# Patient Record
Sex: FEMALE | Race: WHITE | NOT HISPANIC OR LATINO | Employment: OTHER | URBAN - METROPOLITAN AREA
[De-identification: names, ages, dates, MRNs, and addresses within clinical notes are randomized per-mention and may not be internally consistent; named-entity substitution may affect disease eponyms.]

---

## 2023-09-27 ENCOUNTER — OFFICE VISIT (OUTPATIENT)
Dept: PRIMARY CARE | Facility: CLINIC | Age: 73
End: 2023-09-27
Payer: MEDICARE

## 2023-09-27 ENCOUNTER — LAB (OUTPATIENT)
Dept: LAB | Facility: LAB | Age: 73
End: 2023-09-27
Payer: MEDICARE

## 2023-09-27 VITALS
HEART RATE: 68 BPM | DIASTOLIC BLOOD PRESSURE: 64 MMHG | OXYGEN SATURATION: 97 % | WEIGHT: 173 LBS | BODY MASS INDEX: 30.65 KG/M2 | RESPIRATION RATE: 20 BRPM | TEMPERATURE: 97.6 F | SYSTOLIC BLOOD PRESSURE: 118 MMHG

## 2023-09-27 DIAGNOSIS — R42 DIZZINESSES: Primary | ICD-10-CM

## 2023-09-27 DIAGNOSIS — H91.91 DECREASED HEARING OF RIGHT EAR: ICD-10-CM

## 2023-09-27 DIAGNOSIS — J06.9 UPPER RESPIRATORY TRACT INFECTION, UNSPECIFIED TYPE: ICD-10-CM

## 2023-09-27 DIAGNOSIS — R42 DIZZINESSES: ICD-10-CM

## 2023-09-27 PROBLEM — R21 LOCALIZED PAPULAR RASH: Status: ACTIVE | Noted: 2023-09-27

## 2023-09-27 PROBLEM — R10.2 PELVIC PRESSURE IN FEMALE: Status: ACTIVE | Noted: 2023-09-27

## 2023-09-27 PROBLEM — I83.12 VARICOSE VEINS OF LOWER EXTREMITY WITH INFLAMMATION, BILATERAL: Status: ACTIVE | Noted: 2023-09-27

## 2023-09-27 PROBLEM — K22.70 BARRETT ESOPHAGUS: Status: ACTIVE | Noted: 2023-09-27

## 2023-09-27 PROBLEM — H91.93 HEARING LOSS, BILATERAL: Status: ACTIVE | Noted: 2023-09-27

## 2023-09-27 PROBLEM — M54.9 BACK PAIN, ACUTE: Status: ACTIVE | Noted: 2023-09-27

## 2023-09-27 PROBLEM — L21.9 SEBORRHEIC DERMATITIS: Status: ACTIVE | Noted: 2023-09-27

## 2023-09-27 PROBLEM — R79.89 ABNORMAL TSH: Status: ACTIVE | Noted: 2023-09-27

## 2023-09-27 PROBLEM — J01.00 ACUTE MAXILLARY SINUSITIS: Status: ACTIVE | Noted: 2023-09-27

## 2023-09-27 PROBLEM — I83.11 VARICOSE VEINS OF LOWER EXTREMITY WITH INFLAMMATION, BILATERAL: Status: ACTIVE | Noted: 2023-09-27

## 2023-09-27 PROBLEM — K21.9 GERD (GASTROESOPHAGEAL REFLUX DISEASE): Status: ACTIVE | Noted: 2023-09-27

## 2023-09-27 LAB
ALANINE AMINOTRANSFERASE (SGPT) (U/L) IN SER/PLAS: 19 U/L (ref 7–45)
ALBUMIN (G/DL) IN SER/PLAS: 4.2 G/DL (ref 3.4–5)
ALKALINE PHOSPHATASE (U/L) IN SER/PLAS: 53 U/L (ref 33–136)
ANION GAP IN SER/PLAS: 10 MMOL/L (ref 10–20)
ASPARTATE AMINOTRANSFERASE (SGOT) (U/L) IN SER/PLAS: 22 U/L (ref 9–39)
BILIRUBIN TOTAL (MG/DL) IN SER/PLAS: 0.8 MG/DL (ref 0–1.2)
CALCIUM (MG/DL) IN SER/PLAS: 9.2 MG/DL (ref 8.6–10.3)
CARBON DIOXIDE, TOTAL (MMOL/L) IN SER/PLAS: 30 MMOL/L (ref 21–32)
CHLORIDE (MMOL/L) IN SER/PLAS: 105 MMOL/L (ref 98–107)
CREATININE (MG/DL) IN SER/PLAS: 1.08 MG/DL (ref 0.5–1.05)
ERYTHROCYTE DISTRIBUTION WIDTH (RATIO) BY AUTOMATED COUNT: 13.2 % (ref 11.5–14.5)
ERYTHROCYTE MEAN CORPUSCULAR HEMOGLOBIN CONCENTRATION (G/DL) BY AUTOMATED: 32.1 G/DL (ref 32–36)
ERYTHROCYTE MEAN CORPUSCULAR VOLUME (FL) BY AUTOMATED COUNT: 94 FL (ref 80–100)
ERYTHROCYTES (10*6/UL) IN BLOOD BY AUTOMATED COUNT: 4.62 X10E12/L (ref 4–5.2)
GFR FEMALE: 54 ML/MIN/1.73M2
GLUCOSE (MG/DL) IN SER/PLAS: 96 MG/DL (ref 74–99)
HEMATOCRIT (%) IN BLOOD BY AUTOMATED COUNT: 43.3 % (ref 36–46)
HEMOGLOBIN (G/DL) IN BLOOD: 13.9 G/DL (ref 12–16)
LEUKOCYTES (10*3/UL) IN BLOOD BY AUTOMATED COUNT: 5.2 X10E9/L (ref 4.4–11.3)
PLATELETS (10*3/UL) IN BLOOD AUTOMATED COUNT: 201 X10E9/L (ref 150–450)
POTASSIUM (MMOL/L) IN SER/PLAS: 4.2 MMOL/L (ref 3.5–5.3)
PROTEIN TOTAL: 7 G/DL (ref 6.4–8.2)
SODIUM (MMOL/L) IN SER/PLAS: 141 MMOL/L (ref 136–145)
UREA NITROGEN (MG/DL) IN SER/PLAS: 18 MG/DL (ref 6–23)

## 2023-09-27 PROCEDURE — 80053 COMPREHEN METABOLIC PANEL: CPT

## 2023-09-27 PROCEDURE — 99214 OFFICE O/P EST MOD 30 MIN: CPT | Performed by: NURSE PRACTITIONER

## 2023-09-27 PROCEDURE — 1036F TOBACCO NON-USER: CPT | Performed by: NURSE PRACTITIONER

## 2023-09-27 PROCEDURE — 1159F MED LIST DOCD IN RCRD: CPT | Performed by: NURSE PRACTITIONER

## 2023-09-27 PROCEDURE — 1160F RVW MEDS BY RX/DR IN RCRD: CPT | Performed by: NURSE PRACTITIONER

## 2023-09-27 PROCEDURE — 87635 SARS-COV-2 COVID-19 AMP PRB: CPT

## 2023-09-27 PROCEDURE — 85027 COMPLETE CBC AUTOMATED: CPT

## 2023-09-27 PROCEDURE — 36415 COLL VENOUS BLD VENIPUNCTURE: CPT

## 2023-09-27 RX ORDER — CALCIUM CARB/MAGNESIUM CARB 311-232MG
TABLET ORAL
COMMUNITY

## 2023-09-27 RX ORDER — AMOXICILLIN AND CLAVULANATE POTASSIUM 875; 125 MG/1; MG/1
875 TABLET, FILM COATED ORAL 2 TIMES DAILY
Qty: 20 TABLET | Refills: 0 | Status: SHIPPED | OUTPATIENT
Start: 2023-09-27 | End: 2023-10-07

## 2023-09-27 RX ORDER — FLUTICASONE PROPIONATE 50 MCG
1 SPRAY, SUSPENSION (ML) NASAL DAILY
Qty: 16 G | Refills: 0 | Status: SHIPPED | OUTPATIENT
Start: 2023-09-27 | End: 2023-11-07 | Stop reason: ALTCHOICE

## 2023-09-27 RX ORDER — ASPIRIN 81 MG/1
81 TABLET ORAL DAILY
COMMUNITY
End: 2023-11-07 | Stop reason: ALTCHOICE

## 2023-09-27 ASSESSMENT — ENCOUNTER SYMPTOMS
MYALGIAS: 0
APPETITE CHANGE: 1
SINUS PAIN: 1
SPEECH DIFFICULTY: 0
NAUSEA: 0
DIZZINESS: 1
SORE THROAT: 0
ABDOMINAL PAIN: 0
WHEEZING: 0
VOMITING: 1
HEADACHES: 0
FATIGUE: 1
DIARRHEA: 0
FEVER: 0
WEAKNESS: 0
CHILLS: 0
COUGH: 1
SHORTNESS OF BREATH: 0
SINUS PRESSURE: 1
LIGHT-HEADEDNESS: 0
RHINORRHEA: 1

## 2023-09-27 NOTE — PROGRESS NOTES
Subjective   Patient ID: Lauren Patel is a 73 y.o. female who presents for Sinusitis.    Patient has had a lot of dizziness since April.     Patient says that she developed a runny nose, stuffy nose one week ago. Patient says that she has a lot of sinus pressure. Patient says that yesterday when she was taking a walk, she noticed that her balanced was off. Patient says that she needs to hold on to the walls when walking. Patient vomited today. Patient is vaccinated against COVID with boosters (not the most recent). Pt denies any weakness. Pt did an at home COVID test today and it was negative.     She says that she does not have a PCP.          Review of Systems   Constitutional:  Positive for appetite change and fatigue. Negative for chills and fever.   HENT:  Positive for congestion, rhinorrhea, sinus pressure and sinus pain. Negative for sore throat.    Respiratory:  Positive for cough. Negative for shortness of breath and wheezing.    Cardiovascular:  Negative for chest pain.   Gastrointestinal:  Positive for vomiting. Negative for abdominal pain, diarrhea and nausea.   Musculoskeletal:  Negative for myalgias.   Neurological:  Positive for dizziness. Negative for speech difficulty, weakness, light-headedness and headaches.       Objective   /64   Pulse 68   Temp 36.4 °C (97.6 °F) (Temporal)   Resp 20   Wt 78.5 kg (173 lb)   SpO2 97%   BMI 30.65 kg/m²     Physical Exam  Vitals reviewed.   Constitutional:       General: She is not in acute distress.     Appearance: Normal appearance. She is not ill-appearing or toxic-appearing.   HENT:      Head: Atraumatic.      Right Ear: Tympanic membrane, ear canal and external ear normal.      Left Ear: Tympanic membrane, ear canal and external ear normal.      Nose: Congestion and rhinorrhea present.      Right Sinus: Maxillary sinus tenderness and frontal sinus tenderness present.      Left Sinus: Maxillary sinus tenderness and frontal sinus tenderness  present.      Mouth/Throat:      Mouth: Mucous membranes are moist.      Pharynx: Oropharynx is clear. No oropharyngeal exudate or posterior oropharyngeal erythema.   Eyes:      Conjunctiva/sclera: Conjunctivae normal.      Pupils: Pupils are equal, round, and reactive to light.   Cardiovascular:      Rate and Rhythm: Normal rate and regular rhythm.      Heart sounds: Normal heart sounds. No murmur heard.  Pulmonary:      Effort: Pulmonary effort is normal.      Breath sounds: Normal breath sounds. No wheezing or rhonchi.   Musculoskeletal:         General: Normal range of motion.   Skin:     General: Skin is warm and dry.   Neurological:      General: No focal deficit present.      Mental Status: She is alert.   Psychiatric:         Mood and Affect: Mood normal.         Behavior: Behavior normal.     Assessment/Plan   Problem List Items Addressed This Visit    None  Visit Diagnoses         Codes    Dizzinesses    -  Primary R42    Relevant Orders    CBC    Comprehensive Metabolic Panel    Decreased hearing of right ear     H91.91    Relevant Orders    Referral to ENT    Upper respiratory tract infection, unspecified type     J06.9    Relevant Medications    amoxicillin-pot clavulanate (Augmentin) 875-125 mg tablet    fluticasone (Flonase) 50 mcg/actuation nasal spray    Other Relevant Orders    Sars-CoV-2 PCR, Symptomatic        Patient does not have a PCP at this time and has not had labs done in several years. Will order basic blood work.  staff to help patient establish care with a new provider.     Patient has had some hearing loss in the ear. She was referred to ENT for a hearing test and further evaluation.     She has also been having on going dizziness. I advised patient that she should have a CT scan to further evaluate her ongoing dizziness. Patient declined today and said that she would like to talk it over with her . Patient declines the need to do further workup regarding this.      Will get COVID testing done today. Pt also started on augmentin. Advised patient on use of humidifier and hot steam treatments. Discussed that patient is to drink plenty of fluids and stay well hydrated. Can take tylenol as needed for any fevers or discomfort. Patient can use mucinex and flonase as well. Discussed that patient is to go to the ER for any chest pain, difficulty breathing, shortness of breath or new/concerning symptoms; she agreed. Will call pt when results become available. Pt reminded to self quarantine; she agreed. Pt to follow up in 2- 3 days if no better despite the use of the medications.

## 2023-09-28 ENCOUNTER — TELEPHONE (OUTPATIENT)
Dept: PRIMARY CARE | Facility: CLINIC | Age: 73
End: 2023-09-28
Payer: MEDICARE

## 2023-09-28 LAB — SARS-COV-2 RESULT: NOT DETECTED

## 2023-09-28 NOTE — TELEPHONE ENCOUNTER
Spoke to patient and relayed results of blood work. Patient's creatinine is slightly elevated from last year. Patient has been having back pain and has been taking ibuprofen daily. I advised that she should stop taking ibuprofen and try tylenol instead. Patient was encouraged to establish care with a provider and she says that she will schedule an appt with a doctor that her daughter sees that is by her house. Patient declined the need for any help setting up an appt.

## 2023-10-18 ENCOUNTER — APPOINTMENT (OUTPATIENT)
Dept: AUDIOLOGY | Facility: CLINIC | Age: 73
End: 2023-10-18
Payer: MEDICARE

## 2023-10-18 ENCOUNTER — APPOINTMENT (OUTPATIENT)
Dept: OTOLARYNGOLOGY | Facility: CLINIC | Age: 73
End: 2023-10-18
Payer: MEDICARE

## 2023-10-18 RX ORDER — PANTOPRAZOLE SODIUM 40 MG/1
40 TABLET, DELAYED RELEASE ORAL 2 TIMES DAILY
COMMUNITY
Start: 2014-12-23 | End: 2023-11-07 | Stop reason: ALTCHOICE

## 2023-10-18 RX ORDER — ACETAMINOPHEN 325 MG/1
650 TABLET ORAL EVERY 6 HOURS PRN
COMMUNITY
End: 2023-11-07 | Stop reason: ALTCHOICE

## 2023-10-18 RX ORDER — OMEPRAZOLE 20 MG/1
TABLET, DELAYED RELEASE ORAL
COMMUNITY
End: 2023-11-07 | Stop reason: ALTCHOICE

## 2023-10-18 RX ORDER — SUCRALFATE 1 G/1
TABLET ORAL
COMMUNITY
Start: 2015-04-01 | End: 2023-11-07 | Stop reason: ALTCHOICE

## 2023-10-18 RX ORDER — PHENYLPROPANOLAMINE/CLEMASTINE 75-1.34MG
200 TABLET, EXTENDED RELEASE ORAL AS NEEDED
COMMUNITY
End: 2023-11-07 | Stop reason: ALTCHOICE

## 2023-10-18 RX ORDER — LANSOPRAZOLE 30 MG/1
30 TABLET, ORALLY DISINTEGRATING, DELAYED RELEASE ORAL
COMMUNITY
Start: 2017-04-05 | End: 2023-11-07 | Stop reason: ALTCHOICE

## 2023-10-23 ENCOUNTER — OFFICE VISIT (OUTPATIENT)
Dept: OTOLARYNGOLOGY | Facility: CLINIC | Age: 73
End: 2023-10-23
Payer: MEDICARE

## 2023-10-23 ENCOUNTER — CLINICAL SUPPORT (OUTPATIENT)
Dept: AUDIOLOGY | Facility: CLINIC | Age: 73
End: 2023-10-23
Payer: MEDICARE

## 2023-10-23 VITALS
DIASTOLIC BLOOD PRESSURE: 79 MMHG | SYSTOLIC BLOOD PRESSURE: 133 MMHG | WEIGHT: 170.6 LBS | HEIGHT: 62 IN | TEMPERATURE: 97.3 F | BODY MASS INDEX: 31.39 KG/M2

## 2023-10-23 DIAGNOSIS — H90.3 BILATERAL HIGH FREQUENCY SENSORINEURAL HEARING LOSS: Primary | ICD-10-CM

## 2023-10-23 DIAGNOSIS — H90.3 BILATERAL SENSORINEURAL HEARING LOSS: Primary | ICD-10-CM

## 2023-10-23 DIAGNOSIS — H93.13 TINNITUS OF BOTH EARS: ICD-10-CM

## 2023-10-23 PROCEDURE — 1036F TOBACCO NON-USER: CPT | Performed by: OTOLARYNGOLOGY

## 2023-10-23 PROCEDURE — 1160F RVW MEDS BY RX/DR IN RCRD: CPT | Performed by: OTOLARYNGOLOGY

## 2023-10-23 PROCEDURE — 99204 OFFICE O/P NEW MOD 45 MIN: CPT | Performed by: OTOLARYNGOLOGY

## 2023-10-23 PROCEDURE — 1159F MED LIST DOCD IN RCRD: CPT | Performed by: OTOLARYNGOLOGY

## 2023-10-23 PROCEDURE — 92557 COMPREHENSIVE HEARING TEST: CPT | Performed by: AUDIOLOGIST

## 2023-10-23 PROCEDURE — 92550 TYMPANOMETRY & REFLEX THRESH: CPT | Performed by: AUDIOLOGIST

## 2023-10-23 ASSESSMENT — PAIN - FUNCTIONAL ASSESSMENT: PAIN_FUNCTIONAL_ASSESSMENT: 0-10

## 2023-10-23 ASSESSMENT — ENCOUNTER SYMPTOMS: OCCASIONAL FEELINGS OF UNSTEADINESS: 0

## 2023-10-23 ASSESSMENT — PAIN SCALES - GENERAL: PAINLEVEL_OUTOF10: 0 - NO PAIN

## 2023-10-23 NOTE — PROGRESS NOTES
Subjective   Patient ID: Lauren Patel is a 73 y.o. female who presents for No chief complaint on file..  Subjective:  HPI  Lauren is a 73 year old female here for hearing loss.  The duration of his complaint is ***.    When asked about ear pain, ear itching,  ear drainage, aural fullness, hearing loss, autophony, tinnitus, dizziness or vertigo, he admits to ***.   When asked about previous ear surgeries, family history of hearing loss, or exposure to loud noise, the patient admits to ***.   When asked about past or current use of hearing aids, the patient admits to ***.   When asked about past or current use of Q-tips or foreign objects in the ear canal, the patient admits to ***.     Review of Systems   All other systems reviewed and are negative.    Objective   Physical Exam  Constitutional: No fever, chills, weight loss or weight gain  General appearance: Appears well, well-nourished, well groomed. No acute distress.   Communication: Normal communication  Psychiatric: Oriented to person, place and time. Normal mood and affect.  Neurologic: Cranial nerves II-XII grossly intact and symmetric bilaterally.     Head and Face:  Head: Atraumatic with no masses, lesions or scarring.  Face: Normal symmetry. No scars or deformities.  TMJ: Normal, no trismus.     Eyes: Conjunctiva not edematous or erythematous. PERRLA     Right Ear: External inspection of ear with no deformity, scars, or masses. EAC is clear.  TM is intact with no sign of infection, effusion, or retraction.  No perforation seen.  Left Ear: External inspection of ear with no deformity, scars, or masses. EAC is clear.  TM is intact with no sign of infection, effusion, or retraction.  No perforation seen.     Nose: External inspection of nose: No nasal lesions, lacerations or scars. Anterior rhinoscopy with limited visualization past the inferior turbinates. No tenderness on frontal or maxillary sinus palpation.     Oral Cavity/Mouth: Oral cavity and  oropharynx mucosa moist and pink. No lesions or masses. Dentition normal. Tonsils appear normal. Uvula is midline. Tongue with no masses or lesions. Tongue with good mobility. The oropharynx is clear.     Neck: Normal appearing, symmetric, trachea midline.   Cardiovascular: Examination of peripheral vascular system shows no clubbing or cyanosis.  Respiratory: No respiratory distress increased work of breathing. Inspection of the chest with symmetric chest expansion and normal respiratory effort.  Skin: No head and neck rashes.  Lymph nodes: No adenopathy.    RESULTS:  I personally reviewed the patient's audiogram today which showed: Normal hearing across all frequencies in bilateral ears. Normal tympanogram bilaterally. Excellent word discrimination bilaterally. Acoustic reflexes present right ipsilateral, and left ipsilateral.  Assessment/Plan   {Assess/PlanSmartLinks:[id number]}

## 2023-10-23 NOTE — PROGRESS NOTES
AUDIOLOGY ADULT AUDIOMETRIC EVALUATION      Name:  Lauren Patel  :  1950  Age:  73 y.o.  Date of Evaluation: 10/23/23    History:  Reason for visit:  Ms. Patel is seen today as part of the visit with Misbah Villa D.O. for an evaluation of hearing.   Chief Complaint   Patient presents with    Hearing Loss      Stated for the past fifteen to twenty years she has noticed a gradual loss of hearing sensitivity in each ear. Stated she tried hearing aids approximately fifteen years ago, however, stated she did not find them to be helpful and they were uncomfortable. She has not worn hearing aids since that time.   Admitted she has experienced more difficulty understanding conversations and has been missing words. Her  has been more concerned for her hearing and communication.   The patient reported a dull ringing bilateral constant non-bothersome tinnitus for many years. Denied any changes in the tinnitus. Denied any difficulty sleeping or communicating due to the tinnitus.  History of some slight imbalance and dizziness and has noticed some slight sensations of feeling off balance or spinning slightly if she moves towards the right.   Denied any otalgia, aural fullness, ear pressure, ear surgery, recent ear/sinus infections, recent falls, significant noise exposure, sinus/throat concerns, ear drainage, or sudden hearing loss.    EVALUATION      See Audiogram    RESULTS:    Otoscopic Evaluation:   Right Ear: Otoscopy for the right ear revealed a clear healthy canal and a healthy tympanic membrane was visualized.  Note narrow canal  Left Ear: Otoscopy for the left ear revealed a clear healthy canal and a healthy tympanic membrane was visualized.  Note narrow canal.    Immittance:  Immittance Measures: 226 Hz   Right Ear: Tympanometric testing of the right ear revealed a type B flat tympanogram with no measurable middle ear pressure or static compliance and normal ear canal volume. Results should be  interpreted with caution as it was very difficult to seal the canal.  Left Ear: Tympanometric testing of the right ear revealed a normal type A tympanogram with normal middle ear pressure and normal static compliance.    Ipsilateral acoustic reflexes were absent at, 500-4,000 Hz, in the right ear and present only at 2,000 Hz in the left ear. Results are consistent with the test results.     Test technique:  Pure Tone Audiometry via insert earphones     Reliability:   excellent    Pure Tone Audiometry:    Right Ear: Audiometric testing of the right ear using insert earphones indicated a mild sensorineural hearing loss through 500 Hz, gradually sloping to a moderate sensorineural hearing loss above.  Left Ear:   Audiometric testing of the left ear using insert earphones indicated a mild sensorineural hearing loss through 500 Hz, gradually sloping to a moderate sensorineural hearing loss above.      Speech Audiometry:   Right Ear:  Speech Reception Threshold (SRT) was obtained at 55 dBHL                 Word Recognition scores were fair (76%) in quiet when words were presented at 85 dBHL  Left Ear:  Speech Reception Threshold (SRT) was obtained at 50 dBHL                 Word Recognition scores were excellent (92%) in quiet when words were presented at 85 dBHL  Testing was performed with recorded NU-6 speech words in quiet. Speech thresholds were in good agreement with the pure tone averages in each ear.   Note significant asymmetry in the right ear given the symmetrical pure tone responses.     IMPRESSIONS:  Today's test results are hearing loss requiring medical/otologic and audiologic follow-up.  The patient was counseled with regard to the findings.    Amplification needs:  Hearing aids were recommended due to the hearing loss and the patient's concerns for hearing difficulties.     RECOMMENDATIONS:  * Continue medical follow up with Dr. Villa.  * Retest as medically indicated, or sooner if a change in hearing  sensitivity or tinnitus is noticed.   * Wear hearing protection while in the presence of loud sounds.   * Use tinnitus coping strategies as needed, such as sound apps on a smart phone, utilizing calming noise in the room, running a fan at night, etc.   * Pending medical management and patient desire, consider returning for a hearing aid evaluation to discuss amplification options and communication needs. The patient was instructed to call their insurance to check for any hearing aid benefits and to determine if [facility] was in network for hearing aids.   * Use effective communication strategies such as asking the speaker to gain attention prior to speaking, speaking in the same room, repeating words that were heard, etc.    PATIENT EDUCATION:   Discussed results and recommendations with the patient and a copy of the hearing test was provided.  Questions were addressed and the patient was encouraged to contact our department should concerns arise.  Discussed speech intelligibility, cognitive load and listening effort. The patient was counseled although there are still significant hearing abilities, the sound sounds of speech are not coming in as easily as they once did. Counseled to consider hearing aids, to help reduce the amount of listening effort required by the brain.  The patient was seen from  10:00-10:30 am.

## 2023-10-23 NOTE — PROGRESS NOTES
Impression:  1. Bilateral high frequency sensorineural hearing loss  Referral to ENT         RECOMMENDATIONS/PLAN :  I reassured the patient that medically her ears look excellent.  It appears that she would be a good candidate for hearing aids and she is considering some behind-the-ear hearing aids in the future.  We will repeat an audiogram over the next couple of years or sooner with any sudden changes.      **This electronic medical record note was created with the use of voice recognition software.  Despite proofreading, typographical or grammatical errors may be present that could affect meaning of content **    Subjective   Patient ID:     Lauren Patel is a 73 y.o. female who presents to the office today with a longstanding history of hearing loss.  She denies any recent trauma to the head neck or ears or any history of chronic middle ear infections.  Many years ago she did have vertigo but she has not had any true spinning lately.  She denies any other neurologic complaints.    ROS:  A detailed 12 system review of systems is noted on the intake form has been reviewed with the patient with details noted in the HPI and scanned into the patient's medical record.    Objective     No past medical history on file.     Past Surgical History:   Procedure Laterality Date    OTHER SURGICAL HISTORY  11/01/2019    No history of surgery        Allergies   Allergen Reactions    Sulfamethoxazole Unknown          Current Outpatient Medications:     acetaminophen (Tylenol) 325 mg tablet, Take 2 tablets (650 mg) by mouth every 6 hours if needed., Disp: , Rfl:     aspirin 81 mg EC tablet, Take 1 tablet (81 mg) by mouth once daily., Disp: , Rfl:     melatonin 5 mg tablet,disintegrating, Take by mouth., Disp: , Rfl:     fluticasone (Flonase) 50 mcg/actuation nasal spray, Administer 1 spray into each nostril once daily. Shake gently. Before first use, prime pump. After use, clean tip and replace cap. (Patient not taking:  "Reported on 10/23/2023), Disp: 16 g, Rfl: 0    ibuprofen (Motrin) capsule, Take 1 capsule (200 mg) by mouth if needed., Disp: , Rfl:     lansoprazole (Prevacid SoluTab) 30 mg disintegrating tablet, Take 1 tablet (30 mg) by mouth once daily., Disp: , Rfl:     multivitamin with minerals iron-free (multivit-iron-minerals-folic acid), Take by mouth., Disp: , Rfl:     omeprazole OTC (PriLOSEC OTC) 20 mg EC tablet, Take by mouth., Disp: , Rfl:     pantoprazole (ProtoNix) 40 mg EC tablet, Take 1 tablet (40 mg) by mouth 2 times a day., Disp: , Rfl:     sucralfate (Carafate) 1 gram tablet, 1 take by mouth four times a day, dissolve on tab in water and drink mixture four times a day, Disp: , Rfl:      Tobacco Use: Low Risk  (10/23/2023)    Patient History     Smoking Tobacco Use: Never     Smokeless Tobacco Use: Never     Passive Exposure: Not on file        Alcohol Use: Not on file        Social History     Substance and Sexual Activity   Drug Use Never        Physical Exam:  Visit Vitals  /79   Temp 36.3 °C (97.3 °F) (Temporal)   Ht 1.575 m (5' 2\")   Wt 77.4 kg (170 lb 9.6 oz)   BMI 31.20 kg/m²   Smoking Status Never   BSA 1.84 m²      General: Patient is alert, oriented, cooperative in no apparent distress.  Head: Normocephalic, atraumatic.  Eyes: PERRL, EOMI, Conjunctiva is clear. No nystagmus.  Ears: Right Ear-- Pinna is normal.  External auditory canal is patent, no lesions. Tympanic membrane is [intact, translucent and has good mobility with my pneumatic otoscope. No effusion].  Mastoid is nontender.  Left ear-- Pinna is normal.  External auditory canal is patent, no lesions.  Tympanic membrane is [intact, translucent and has good mobility with my pneumatic otoscope.  No effusion].  Mastoid is nontender.  Nose: Septum is relatively straight.  No septal perforation or lesions. No septal hematoma/ seroma.  No signs of bleeding.  Inferior turbinates are normal.   No evidence of intranasal polyps.    Throat:  Floor " of mouth is clear, no masses.  Tongue appears normal, no lesions or masses. Gums, gingiva, buccal mucosa appear pink and moist, no lesions.  Upper and lower denture.  No lesions.  Peritonsillar regions appear symmetric without swelling.  Hard and soft palate appear normal, no obvious cleft. Uvula is midline.  Oropharynx: No lesions. Retropharyngeal wall is flat.  No active postnasal drip.  Neck: Supple,  no lymphadenopathy.  No masses.  Salivary Glands: Symmetric bilaterally.  No palpable masses.  No evidence of acute infection or salivary stones  Neurologic: Cranial Nerves 2-12 are grossly intact without focal deficits. Cerebellar function testing is normal.     Results:   I reviewed her recent audiogram and she has a moderate to severe high-frequency sensorineural loss in both ears.  Both tympanic membranes have good mobility on tympanometry.  Word recognition score was 76% in the right ear and 92% in the left ear.  Speech reception threshold is 55 dB in the right ear and 50 dB in the left ear.  Procedure:   []    Misbah Villa, DO

## 2023-11-07 ENCOUNTER — LAB (OUTPATIENT)
Dept: LAB | Facility: LAB | Age: 73
End: 2023-11-07
Payer: MEDICARE

## 2023-11-07 ENCOUNTER — OFFICE VISIT (OUTPATIENT)
Dept: PRIMARY CARE | Facility: CLINIC | Age: 73
End: 2023-11-07
Payer: MEDICARE

## 2023-11-07 VITALS
TEMPERATURE: 97.2 F | WEIGHT: 170.2 LBS | SYSTOLIC BLOOD PRESSURE: 128 MMHG | DIASTOLIC BLOOD PRESSURE: 82 MMHG | BODY MASS INDEX: 30.16 KG/M2 | HEART RATE: 60 BPM | HEIGHT: 63 IN

## 2023-11-07 DIAGNOSIS — Z78.9 NON-SMOKER: ICD-10-CM

## 2023-11-07 DIAGNOSIS — R79.89 ABNORMAL TSH: ICD-10-CM

## 2023-11-07 DIAGNOSIS — E03.8 TSH (THYROID-STIMULATING HORMONE DEFICIENCY): ICD-10-CM

## 2023-11-07 DIAGNOSIS — Z13.89 OBESITY SCREEN: ICD-10-CM

## 2023-11-07 DIAGNOSIS — H91.8X3 OTHER SPECIFIED HEARING LOSS OF BOTH EARS: ICD-10-CM

## 2023-11-07 DIAGNOSIS — N18.31 STAGE 3A CHRONIC KIDNEY DISEASE (MULTI): ICD-10-CM

## 2023-11-07 DIAGNOSIS — Z87.39 HX OF LOW BACK PAIN: ICD-10-CM

## 2023-11-07 DIAGNOSIS — Z86.79 HISTORY OF VARICOSE VEINS: ICD-10-CM

## 2023-11-07 LAB
25(OH)D3 SERPL-MCNC: 28 NG/ML (ref 30–100)
ALBUMIN SERPL BCP-MCNC: 4.3 G/DL (ref 3.4–5)
ALP SERPL-CCNC: 60 U/L (ref 33–136)
ALT SERPL W P-5'-P-CCNC: 15 U/L (ref 7–45)
ANION GAP SERPL CALC-SCNC: 12 MMOL/L (ref 10–20)
AST SERPL W P-5'-P-CCNC: 19 U/L (ref 9–39)
BASOPHILS # BLD AUTO: 0.03 X10*3/UL (ref 0–0.1)
BASOPHILS NFR BLD AUTO: 0.7 %
BILIRUB SERPL-MCNC: 1.1 MG/DL (ref 0–1.2)
BUN SERPL-MCNC: 16 MG/DL (ref 6–23)
CALCIUM SERPL-MCNC: 9.1 MG/DL (ref 8.6–10.3)
CHLORIDE SERPL-SCNC: 103 MMOL/L (ref 98–107)
CHOLEST SERPL-MCNC: 177 MG/DL (ref 0–199)
CHOLESTEROL/HDL RATIO: 2.9
CO2 SERPL-SCNC: 29 MMOL/L (ref 21–32)
CREAT SERPL-MCNC: 0.93 MG/DL (ref 0.5–1.05)
EOSINOPHIL # BLD AUTO: 0.28 X10*3/UL (ref 0–0.4)
EOSINOPHIL NFR BLD AUTO: 6.7 %
ERYTHROCYTE [DISTWIDTH] IN BLOOD BY AUTOMATED COUNT: 13.5 % (ref 11.5–14.5)
GFR SERPL CREATININE-BSD FRML MDRD: 64 ML/MIN/1.73M*2
GLUCOSE SERPL-MCNC: 78 MG/DL (ref 74–99)
HCT VFR BLD AUTO: 46.4 % (ref 36–46)
HDLC SERPL-MCNC: 61.2 MG/DL
HGB BLD-MCNC: 14.3 G/DL (ref 12–16)
IMM GRANULOCYTES # BLD AUTO: 0.01 X10*3/UL (ref 0–0.5)
IMM GRANULOCYTES NFR BLD AUTO: 0.2 % (ref 0–0.9)
LDLC SERPL CALC-MCNC: 94 MG/DL
LYMPHOCYTES # BLD AUTO: 0.89 X10*3/UL (ref 0.8–3)
LYMPHOCYTES NFR BLD AUTO: 21.3 %
MCH RBC QN AUTO: 29.8 PG (ref 26–34)
MCHC RBC AUTO-ENTMCNC: 30.8 G/DL (ref 32–36)
MCV RBC AUTO: 97 FL (ref 80–100)
MONOCYTES # BLD AUTO: 0.42 X10*3/UL (ref 0.05–0.8)
MONOCYTES NFR BLD AUTO: 10.1 %
NEUTROPHILS # BLD AUTO: 2.54 X10*3/UL (ref 1.6–5.5)
NEUTROPHILS NFR BLD AUTO: 61 %
NON HDL CHOLESTEROL: 116 MG/DL (ref 0–149)
NRBC BLD-RTO: 0 /100 WBCS (ref 0–0)
PLATELET # BLD AUTO: 227 X10*3/UL (ref 150–450)
POTASSIUM SERPL-SCNC: 4.5 MMOL/L (ref 3.5–5.3)
PROT SERPL-MCNC: 6.8 G/DL (ref 6.4–8.2)
RBC # BLD AUTO: 4.8 X10*6/UL (ref 4–5.2)
SODIUM SERPL-SCNC: 139 MMOL/L (ref 136–145)
T4 FREE SERPL-MCNC: 0.71 NG/DL (ref 0.61–1.12)
TRIGL SERPL-MCNC: 107 MG/DL (ref 0–149)
TSH SERPL-ACNC: 5.57 MIU/L (ref 0.44–3.98)
VLDL: 21 MG/DL (ref 0–40)
WBC # BLD AUTO: 4.2 X10*3/UL (ref 4.4–11.3)

## 2023-11-07 PROCEDURE — 99214 OFFICE O/P EST MOD 30 MIN: CPT | Performed by: INTERNAL MEDICINE

## 2023-11-07 PROCEDURE — 1126F AMNT PAIN NOTED NONE PRSNT: CPT | Performed by: INTERNAL MEDICINE

## 2023-11-07 PROCEDURE — 1159F MED LIST DOCD IN RCRD: CPT | Performed by: INTERNAL MEDICINE

## 2023-11-07 PROCEDURE — 1036F TOBACCO NON-USER: CPT | Performed by: INTERNAL MEDICINE

## 2023-11-07 PROCEDURE — 84439 ASSAY OF FREE THYROXINE: CPT

## 2023-11-07 PROCEDURE — 84443 ASSAY THYROID STIM HORMONE: CPT

## 2023-11-07 PROCEDURE — 36415 COLL VENOUS BLD VENIPUNCTURE: CPT

## 2023-11-07 PROCEDURE — 82306 VITAMIN D 25 HYDROXY: CPT

## 2023-11-07 PROCEDURE — 1160F RVW MEDS BY RX/DR IN RCRD: CPT | Performed by: INTERNAL MEDICINE

## 2023-11-07 PROCEDURE — 85025 COMPLETE CBC W/AUTO DIFF WBC: CPT

## 2023-11-07 PROCEDURE — 80061 LIPID PANEL: CPT

## 2023-11-07 PROCEDURE — 80053 COMPREHEN METABOLIC PANEL: CPT

## 2023-11-07 NOTE — PROGRESS NOTES
"Subjective   Patient ID: Lauren Patel is a 73 y.o. female who presents for Establish Care.    HPI Pt is a pleasant 73 y.o. CF who was seen and evluated during the OV. Pt states that she had hx of hearing loss, varicose vein issue, abnormal kidney and thyroid gland function. Pt does not use any prescription meds. During the clinical encounter pt denies fever, chills, no SOB, no chest pain/tightness, no abdominal pain, no N/V/D reported, no change of urination reported. Pt denies any other health concerns during this visit.  Sohx: reviewed  PMHx and Fhx: reviewed    Review of Systems  Constitutional: no fever, no chills  Eyes: no eyesight problems, no eye redness, no eye pain, no blurred vision  ENT: no ear pain or sore throat, no nasal discharge or congestion, no sinus pressure  Cardiovascular: no chest pain or tightness, no SOB, the heart rate was not fast or slow  Respiratory: no cough, no dyspnea with exertion or with rest, no wheezing  Gastrointestinal: no abdominal pain, no constipation or diarrhea, no heartburn, no nausea or vomiting  Genitourinary: no urine frequency, no dysuria, no urinary urgency, no urinary incontinence or retention  Musculoskeletal: no back pain, no arthralgia, no joint swelling or stiffness, no muscle weakness  Integumentary: no skin lesions or rash  Neurological: no headaches, no dizziness or fainting, no limb weakness  Psychiatric: no mood changes, no anxiety or depression, no suicidal thoughts  Endocrine: no weight change, no temperature intolerance, no change of appetite  Hematologic/Lymphatic: no easy bruising or bleeding    Objective   /82 (BP Location: Left arm)   Pulse 60   Temp 36.2 °C (97.2 °F)   Ht 1.588 m (5' 2.5\")   Wt 77.2 kg (170 lb 3.2 oz)   BMI 30.63 kg/m²     Physical Exam  Constitutional: well hydrated, well nourished, no acute distress. Vital signs reviewed  Head and face: normocephalic, atraumatic  Eyes: no erythema, edema or visible abnormalities of " conjunctiva or lids. Pupils are equal, round and reactive to light. Extraocular movement normal  ENT: external ears without deformities, external ear canals without redness, swelling or tenderness. TMs normal color, normal landmarks, no fluid, non-retracted  Neck: full ROM, no adenopathy, neck was supple, thyroid gland not enlarged, no palpable nodules  Pulmonary: normal respiratory rate and effort. Lungs are clear to auscultation, no rales,no rhonchi or wheezing  Cardiovascular: regular rate and rhythm, normal S1 and S2, no murmur, no gallop, posterior tib. pulse normal 2+ bilaterally, no lower extremity edema  Abdomen: soft, non tender on palpation, not distended, normal BS in all 4 quadrants, no CVA tenderness  Musculoskeletal: no joint swelling, normal movements of all 4 extremities. Gait and station: normal, Digits and nails: normal without clubbing  Skin: normal color, no rash  Neurologic: Cranial nerves: CN II: visual acuity intact. Source: acuity reported by patient. Pupils reactive to light with normal accommodation. Right and left pupil normal CN III, IV and VI: the EO movements were intact. Deep tendon reflexes: were 2+ and symmetric: R and L patella.  Sensory exam: normal light to touch  Psychiatric: Mood and affect: normal, Alert and Oriented x 3  Lymphatic: no cervical lymphadenopathy    Assessment/Plan   Abnormal TSH level: will check TSH/T4 level  Abnormal CR and EGFR:  will check CBC, CMP, Vitamin D level  Hx of varicose vein of the lower extremities: pt refused the referral to the vasculare specialist  Hx of hearing loss: pt FU with audiology team  BMI os 30, consistent with obesity WHO class I; Pt wijl be beneficial form the lifestyle modifications  Plan was d/c with the pt in details, verbalized understanding, agreed  Please follow up with PCP as planned or sooner if needed

## 2023-11-28 ENCOUNTER — OFFICE VISIT (OUTPATIENT)
Dept: PRIMARY CARE | Facility: CLINIC | Age: 73
End: 2023-11-28
Payer: MEDICARE

## 2023-11-28 VITALS
SYSTOLIC BLOOD PRESSURE: 121 MMHG | HEART RATE: 64 BPM | BODY MASS INDEX: 30.62 KG/M2 | HEIGHT: 63 IN | DIASTOLIC BLOOD PRESSURE: 76 MMHG | WEIGHT: 172.8 LBS

## 2023-11-28 DIAGNOSIS — K22.70 BARRETT'S ESOPHAGUS WITHOUT DYSPLASIA: ICD-10-CM

## 2023-11-28 DIAGNOSIS — Z71.2 ENCOUNTER TO DISCUSS TEST RESULTS: Primary | ICD-10-CM

## 2023-11-28 DIAGNOSIS — E03.8 SUBCLINICAL HYPOTHYROIDISM: ICD-10-CM

## 2023-11-28 DIAGNOSIS — D72.818 OTHER DECREASED WHITE BLOOD CELL (WBC) COUNT: ICD-10-CM

## 2023-11-28 DIAGNOSIS — R79.89 ABNORMAL TSH: ICD-10-CM

## 2023-11-28 DIAGNOSIS — E55.9 VITAMIN D INSUFFICIENCY: ICD-10-CM

## 2023-11-28 DIAGNOSIS — E05.90 SUBCLINICAL HYPERTHYROIDISM: ICD-10-CM

## 2023-11-28 PROBLEM — D72.819 LEUCOPENIA: Status: ACTIVE | Noted: 2023-11-28

## 2023-11-28 PROCEDURE — 1036F TOBACCO NON-USER: CPT | Performed by: INTERNAL MEDICINE

## 2023-11-28 PROCEDURE — 99214 OFFICE O/P EST MOD 30 MIN: CPT | Performed by: INTERNAL MEDICINE

## 2023-11-28 PROCEDURE — 1159F MED LIST DOCD IN RCRD: CPT | Performed by: INTERNAL MEDICINE

## 2023-11-28 PROCEDURE — 1160F RVW MEDS BY RX/DR IN RCRD: CPT | Performed by: INTERNAL MEDICINE

## 2023-11-28 PROCEDURE — 1126F AMNT PAIN NOTED NONE PRSNT: CPT | Performed by: INTERNAL MEDICINE

## 2023-11-28 RX ORDER — ERGOCALCIFEROL 1.25 MG/1
50000 CAPSULE ORAL
Qty: 12 CAPSULE | Refills: 0 | Status: SHIPPED | OUTPATIENT
Start: 2023-11-28 | End: 2024-02-20

## 2023-11-28 RX ORDER — DEXTROMETHORPHAN HYDROBROMIDE, GUAIFENESIN 5; 100 MG/5ML; MG/5ML
650 LIQUID ORAL EVERY 8 HOURS PRN
COMMUNITY

## 2023-11-28 RX ORDER — LEVOTHYROXINE SODIUM 25 UG/1
12.5 TABLET ORAL
Qty: 15 TABLET | Refills: 1 | Status: SHIPPED | OUTPATIENT
Start: 2023-11-28 | End: 2024-01-22

## 2023-11-28 RX ORDER — FAMOTIDINE 20 MG/1
TABLET, FILM COATED ORAL
COMMUNITY

## 2023-11-28 ASSESSMENT — PATIENT HEALTH QUESTIONNAIRE - PHQ9
SUM OF ALL RESPONSES TO PHQ9 QUESTIONS 1 AND 2: 0
2. FEELING DOWN, DEPRESSED OR HOPELESS: NOT AT ALL
1. LITTLE INTEREST OR PLEASURE IN DOING THINGS: NOT AT ALL

## 2023-11-28 NOTE — PROGRESS NOTES
Subjective   Patient ID: Lauren Patel is a 73 y.o. female who presents for Follow-up.    HPI Pt is a pleasant 73 y.o. CF who was seen and evaluated during the FU OV/ Pt states that she feels OK. I shared and discussed with the the recent BW results. Pt verbalized understanding. Pt states that she has very dry, brittle hair and unable to loose weigh. Pt aware about abnormal thyroid gland study and would like to be treated with the very low dose of the levothyroxine since she also has the sxs of hypothyroidism. Pt also mentioned that she has the hx of Welch esophagus and should FU with GI on the regular basis. last EGD was done at [facility] back to 2016. During the clinical encounter pt denies fever, chills, no SOB, no chest pain/tightness, no abdominal pain, no N/V/D reported, no change of urination reported. Pt denies any other health concerns during this visit.  Sohx: reviewed  PMHx and Fhx: reviewed    Review of Systems  Constitutional: no fever, no chills  Eyes: no eyesight problems, no eye redness, no eye pain, no blurred vision  ENT: no ear pain or sore throat, no nasal discharge or congestion, no sinus pressure  Cardiovascular: no chest pain or tightness, no SOB, the heart rate was not fast or slow  Respiratory: no cough, no dyspnea with exertion or with rest, no wheezing  Gastrointestinal: no abdominal pain, no constipation or diarrhea, no heartburn, no nausea or vomiting  Genitourinary: no urine frequency, no dysuria, no urinary urgency, no urinary incontinence or retention  Musculoskeletal: no back pain, no arthralgia, no joint swelling or stiffness, no muscle weakness  Integumentary: no skin lesions or rash, but as mentioned at [facility]  Neurological: no headaches, no dizziness or fainting, no limb weakness  Psychiatric: no mood changes, no anxiety or depression, no suicidal thoughts  Endocrine: no temperature intolerance, no change of appetite  Hematologic/Lymphatic: no easy bruising or bleeding    Objective  "  /76 (BP Location: Left arm, Patient Position: Sitting)   Pulse 64   Ht 1.588 m (5' 2.5\")   Wt 78.4 kg (172 lb 12.8 oz)   BMI 31.10 kg/m²     Physical Exam:  Constitutional: well hydrated, well nourished, no acute distress. Vital signs reviewed  Head and face: normocephalic, atraumatic  Eyes: no erythema, edema or visible abnormalities of conjunctiva or lids. Pupils are equal, round and reactive to light. Extraocular movement normal  ENT: external ears without deformities  Neck: full ROM, no adenopathy, neck was supple, thyroid gland not enlarged, no palpable nodules  Pulmonary: normal respiratory rate and effort. Lungs are clear to auscultation, no rales,no rhonchi or wheezing  Cardiovascular: regular rate and rhythm, normal S1 and S2, no murmur, no gallop, posterior tib. pulse normal 2+ bilaterally, no lower extremity edema  Abdomen: soft, non tender on palpation, not distended, normal BS in all 4 quadrants  Musculoskeletal: no joint swelling, normal movements of all 4 extremities. Gait and station: normal, Digits and nails: normal without clubbing  Skin: normal color, no rash  Neurologic: Cranial nerves: CN II: visual acuity intact. Source: acuity reported by patient. Pupils reactive to light with normal accommodation. Right and left pupil normal CN III, IV and VI: the EO movements were intact. CN VIII: no hearing loss. Deep tendon reflexes: were 2+ and symmetric: R and L patella.  Sensory exam: normal light to touch  Psychiatric: Mood and affect: normal, Alert and Oriented x 3  Lymphatic: no cervical lymphadenopathy    Assessment/Plan :  Encounter to discuss the recent test results:  I shared and discussed the recent tests results with the pt in details. Pt verbalized understating.  Abnormal TSH The recent BW results consistent with the subclinical hypothyroidism. Pt reports the sxs of hypothyroidism such as issue with hair, unable to loose weigh. After the extensive discussion and education the " decision about the treatment with the minimal dose of levothyroxine 12.5 mcg was made.. Pt was educated about possible SE of the med and possible health risks. Pt will contact to PCP immediately if she will have any new sxs. Will repeat TSH/T 4 within the next 3 months.      Hx of Welch's esophagitis: referral for GI team evaluation was done    Vitamin D insufficiency: will start on Vitamin D suppl dose of 50.000 international units weekly. Will recheck vitamin D level in 3 months     WBC: will recheck CBC in 2-3 months    Plan was d/c with the pt in details, verbalized understanding, agreed  Please follow up with PCP as planned or sooner if needed

## 2023-12-12 ENCOUNTER — TELEPHONE (OUTPATIENT)
Dept: VASCULAR SURGERY | Facility: CLINIC | Age: 73
End: 2023-12-12
Payer: MEDICARE

## 2023-12-12 NOTE — TELEPHONE ENCOUNTER
Patient calling requesting referral for hearing aids. Stating she has an audiologist appointment on 12/20/2023 and they require referral. Please advise, thank you.

## 2023-12-14 DIAGNOSIS — H91.93 BILATERAL HEARING LOSS, UNSPECIFIED HEARING LOSS TYPE: Primary | ICD-10-CM

## 2023-12-14 NOTE — TELEPHONE ENCOUNTER
Patient said that those 's were not covered by her insurance. She has found one that is covered by her insurance. She has an appointment scheduled with them but she needs a referral in order for her insurance to cover the visit.

## 2024-01-19 DIAGNOSIS — E03.8 SUBCLINICAL HYPOTHYROIDISM: ICD-10-CM

## 2024-01-19 DIAGNOSIS — R79.89 ABNORMAL TSH: ICD-10-CM

## 2024-01-22 RX ORDER — LEVOTHYROXINE SODIUM 25 UG/1
12.5 TABLET ORAL
Qty: 45 TABLET | Refills: 0 | Status: SHIPPED | OUTPATIENT
Start: 2024-01-22 | End: 2024-02-27 | Stop reason: SDUPTHER

## 2024-02-08 ENCOUNTER — OFFICE VISIT (OUTPATIENT)
Dept: GASTROENTEROLOGY | Facility: CLINIC | Age: 74
End: 2024-02-08
Payer: MEDICARE

## 2024-02-08 VITALS
OXYGEN SATURATION: 96 % | SYSTOLIC BLOOD PRESSURE: 124 MMHG | WEIGHT: 171 LBS | TEMPERATURE: 98.2 F | BODY MASS INDEX: 30.3 KG/M2 | DIASTOLIC BLOOD PRESSURE: 72 MMHG | HEIGHT: 63 IN | HEART RATE: 60 BPM | RESPIRATION RATE: 18 BRPM

## 2024-02-08 DIAGNOSIS — Z12.11 COLON CANCER SCREENING: ICD-10-CM

## 2024-02-08 DIAGNOSIS — K22.70 BARRETT'S ESOPHAGUS WITHOUT DYSPLASIA: Primary | ICD-10-CM

## 2024-02-08 DIAGNOSIS — K21.00 GASTROESOPHAGEAL REFLUX DISEASE WITH ESOPHAGITIS WITHOUT HEMORRHAGE: ICD-10-CM

## 2024-02-08 PROCEDURE — 1159F MED LIST DOCD IN RCRD: CPT | Performed by: INTERNAL MEDICINE

## 2024-02-08 PROCEDURE — 1126F AMNT PAIN NOTED NONE PRSNT: CPT | Performed by: INTERNAL MEDICINE

## 2024-02-08 PROCEDURE — 99204 OFFICE O/P NEW MOD 45 MIN: CPT | Performed by: INTERNAL MEDICINE

## 2024-02-08 PROCEDURE — 1036F TOBACCO NON-USER: CPT | Performed by: INTERNAL MEDICINE

## 2024-02-08 RX ORDER — PANTOPRAZOLE SODIUM 40 MG/1
40 TABLET, DELAYED RELEASE ORAL DAILY
Qty: 30 TABLET | Refills: 11 | Status: SHIPPED | OUTPATIENT
Start: 2024-02-08 | End: 2024-03-04 | Stop reason: SDUPTHER

## 2024-02-08 RX ORDER — SODIUM, POTASSIUM,MAG SULFATES 17.5-3.13G
SOLUTION, RECONSTITUTED, ORAL ORAL
Qty: 354 ML | Refills: 0 | Status: SHIPPED | OUTPATIENT
Start: 2024-02-08

## 2024-02-08 ASSESSMENT — ENCOUNTER SYMPTOMS
CARDIOVASCULAR NEGATIVE: 1
CONSTITUTIONAL NEGATIVE: 1
ENDOCRINE NEGATIVE: 1
NEUROLOGICAL NEGATIVE: 1
RESPIRATORY NEGATIVE: 1

## 2024-02-08 NOTE — PATIENT INSTRUCTIONS
Assessment/Plan   Diagnoses and all orders for this visit:  Welch's esophagus without dysplasia  -     Referral to Gastroenterology  -     EGD w Welch's Esophagus; Future  -     pantoprazole (ProtoNix) 40 mg EC tablet; Take 1 tablet (40 mg) by mouth once daily. Do not crush, chew, or split.  Gastroesophageal reflux disease with esophagitis without hemorrhage  -     pantoprazole (ProtoNix) 40 mg EC tablet; Take 1 tablet (40 mg) by mouth once daily. Do not crush, chew, or split.  Colon cancer screening  -     Colonoscopy Screening; Average Risk Patient; Future

## 2024-02-08 NOTE — H&P (VIEW-ONLY)
Subjective   Patient ID: Lauren Patel is a 73 y.o. female who presents for HAN'S ESOPHAGUS (Last EGD 2016-pt brought 5-6 EGD reports, scanned and given to doctor. Coughing throughout the night, regurgitation, c/o itching under her tongue, feeling of choking or difficulty swallowing once in a while. ).  HPI  Patient is a 73-year-old woman with past medical history significant for Han's esophagus last evaluated in 2016 she had maximal extent of 4 cm.  Subsequently was lost to follow-up.  It appears that she has been in increasing reflux symptoms over the last few months she has been taking Pepcid baking soda to try to control the burning sensation in the chest.  Denies any dysphagia.  She is also due for her colonoscopy.  Denies any abdominal pain, nausea, vomiting or weight loss.  Denies any family history of colon cancer or esophageal cancer.  GERD with severe reflux. On and Off.  She has been taking PPI OTC.    Last EGD 2016 : showed 4cm BE>  Burning sensation.    Colonoscopy 2014: repeat in 10 years.      Current Outpatient Medications on File Prior to Visit   Medication Sig Dispense Refill    acetaminophen (Tylenol 8 HOUR) 650 mg ER tablet Take 1 tablet (650 mg) by mouth every 8 hours if needed for mild pain (1 - 3). Do not crush, chew, or split.      ergocalciferol (Vitamin D-2) 1.25 MG (80505 UT) capsule Take 1 capsule (50,000 Units) by mouth 1 (one) time per week. 12 capsule 0    famotidine (Pepcid) 20 mg tablet Take by mouth.      melatonin 5 mg tablet,disintegrating Take by mouth.      multivitamin with minerals iron-free (multivit-iron-minerals-folic acid) Take by mouth.      levothyroxine (Synthroid, Levoxyl) 25 mcg tablet TAKE 0.5 TABLETS (12.5 MCG) BY MOUTH ONCE DAILY IN THE MORNING. TAKE BEFORE MEALS. 45 tablet 0     No current facility-administered medications on file prior to visit.        Review of Systems   Constitutional: Negative.    Respiratory: Negative.     Cardiovascular: Negative.   "  Endocrine: Negative.    Genitourinary: Negative.    Skin: Negative.    Neurological: Negative.        Objective   Physical Exam  Vitals reviewed.   Constitutional:       General: She is awake.      Appearance: Normal appearance.   HENT:      Head: Normocephalic and atraumatic.      Nose: Nose normal.      Mouth/Throat:      Mouth: Mucous membranes are moist.   Eyes:      Pupils: Pupils are equal, round, and reactive to light.   Cardiovascular:      Rate and Rhythm: Normal rate.   Pulmonary:      Effort: Pulmonary effort is normal.   Neurological:      Mental Status: She is alert and oriented to person, place, and time. Mental status is at baseline.   Psychiatric:         Attention and Perception: Attention and perception normal.         Mood and Affect: Mood normal.         Behavior: Behavior normal.       /72 (BP Location: Right arm, Patient Position: Sitting, BP Cuff Size: Adult)   Pulse 60   Temp 36.8 °C (98.2 °F) (Temporal)   Resp 18   Ht 1.6 m (5' 3\")   Wt 77.6 kg (171 lb)   SpO2 96%   BMI 30.29 kg/m²      Lab Results   Component Value Date    WBC 4.2 (L) 11/07/2023    HGB 14.3 11/07/2023    HCT 46.4 (H) 11/07/2023    MCV 97 11/07/2023     11/07/2023           No lab exists for component: \"LABALBU\"    No results found for: \"AFP\"  Lab Results   Component Value Date    TSH 5.57 (H) 11/07/2023         Assessment/Plan   Diagnoses and all orders for this visit:  Welch's esophagus without dysplasia  -     Referral to Gastroenterology  -     EGD w Welch's Esophagus; Future  -     pantoprazole (ProtoNix) 40 mg EC tablet; Take 1 tablet (40 mg) by mouth once daily. Do not crush, chew, or split.  Gastroesophageal reflux disease with esophagitis without hemorrhage  -     pantoprazole (ProtoNix) 40 mg EC tablet; Take 1 tablet (40 mg) by mouth once daily. Do not crush, chew, or split.  Colon cancer screening  -     Colonoscopy Screening; Average Risk Patient; Future           "

## 2024-02-08 NOTE — PROGRESS NOTES
Subjective   Patient ID: Lauren Patel is a 73 y.o. female who presents for HAN'S ESOPHAGUS (Last EGD 2016-pt brought 5-6 EGD reports, scanned and given to doctor. Coughing throughout the night, regurgitation, c/o itching under her tongue, feeling of choking or difficulty swallowing once in a while. ).  HPI  Patient is a 73-year-old woman with past medical history significant for Han's esophagus last evaluated in 2016 she had maximal extent of 4 cm.  Subsequently was lost to follow-up.  It appears that she has been in increasing reflux symptoms over the last few months she has been taking Pepcid baking soda to try to control the burning sensation in the chest.  Denies any dysphagia.  She is also due for her colonoscopy.  Denies any abdominal pain, nausea, vomiting or weight loss.  Denies any family history of colon cancer or esophageal cancer.  GERD with severe reflux. On and Off.  She has been taking PPI OTC.    Last EGD 2016 : showed 4cm BE>  Burning sensation.    Colonoscopy 2014: repeat in 10 years.      Current Outpatient Medications on File Prior to Visit   Medication Sig Dispense Refill    acetaminophen (Tylenol 8 HOUR) 650 mg ER tablet Take 1 tablet (650 mg) by mouth every 8 hours if needed for mild pain (1 - 3). Do not crush, chew, or split.      ergocalciferol (Vitamin D-2) 1.25 MG (61200 UT) capsule Take 1 capsule (50,000 Units) by mouth 1 (one) time per week. 12 capsule 0    famotidine (Pepcid) 20 mg tablet Take by mouth.      melatonin 5 mg tablet,disintegrating Take by mouth.      multivitamin with minerals iron-free (multivit-iron-minerals-folic acid) Take by mouth.      levothyroxine (Synthroid, Levoxyl) 25 mcg tablet TAKE 0.5 TABLETS (12.5 MCG) BY MOUTH ONCE DAILY IN THE MORNING. TAKE BEFORE MEALS. 45 tablet 0     No current facility-administered medications on file prior to visit.        Review of Systems   Constitutional: Negative.    Respiratory: Negative.     Cardiovascular: Negative.   "  Endocrine: Negative.    Genitourinary: Negative.    Skin: Negative.    Neurological: Negative.        Objective   Physical Exam  Vitals reviewed.   Constitutional:       General: She is awake.      Appearance: Normal appearance.   HENT:      Head: Normocephalic and atraumatic.      Nose: Nose normal.      Mouth/Throat:      Mouth: Mucous membranes are moist.   Eyes:      Pupils: Pupils are equal, round, and reactive to light.   Cardiovascular:      Rate and Rhythm: Normal rate.   Pulmonary:      Effort: Pulmonary effort is normal.   Neurological:      Mental Status: She is alert and oriented to person, place, and time. Mental status is at baseline.   Psychiatric:         Attention and Perception: Attention and perception normal.         Mood and Affect: Mood normal.         Behavior: Behavior normal.       /72 (BP Location: Right arm, Patient Position: Sitting, BP Cuff Size: Adult)   Pulse 60   Temp 36.8 °C (98.2 °F) (Temporal)   Resp 18   Ht 1.6 m (5' 3\")   Wt 77.6 kg (171 lb)   SpO2 96%   BMI 30.29 kg/m²      Lab Results   Component Value Date    WBC 4.2 (L) 11/07/2023    HGB 14.3 11/07/2023    HCT 46.4 (H) 11/07/2023    MCV 97 11/07/2023     11/07/2023           No lab exists for component: \"LABALBU\"    No results found for: \"AFP\"  Lab Results   Component Value Date    TSH 5.57 (H) 11/07/2023         Assessment/Plan   Diagnoses and all orders for this visit:  Welch's esophagus without dysplasia  -     Referral to Gastroenterology  -     EGD w Wlech's Esophagus; Future  -     pantoprazole (ProtoNix) 40 mg EC tablet; Take 1 tablet (40 mg) by mouth once daily. Do not crush, chew, or split.  Gastroesophageal reflux disease with esophagitis without hemorrhage  -     pantoprazole (ProtoNix) 40 mg EC tablet; Take 1 tablet (40 mg) by mouth once daily. Do not crush, chew, or split.  Colon cancer screening  -     Colonoscopy Screening; Average Risk Patient; Future           "

## 2024-02-12 ENCOUNTER — LAB (OUTPATIENT)
Dept: LAB | Facility: LAB | Age: 74
End: 2024-02-12
Payer: MEDICARE

## 2024-02-12 ENCOUNTER — TELEPHONE (OUTPATIENT)
Dept: PRIMARY CARE | Facility: CLINIC | Age: 74
End: 2024-02-12

## 2024-02-12 DIAGNOSIS — E55.9 VITAMIN D INSUFFICIENCY: ICD-10-CM

## 2024-02-12 DIAGNOSIS — E05.90 SUBCLINICAL HYPERTHYROIDISM: ICD-10-CM

## 2024-02-12 DIAGNOSIS — D72.818 OTHER DECREASED WHITE BLOOD CELL (WBC) COUNT: ICD-10-CM

## 2024-02-12 DIAGNOSIS — R79.89 ABNORMAL TSH: ICD-10-CM

## 2024-02-12 LAB
25(OH)D3 SERPL-MCNC: 47 NG/ML (ref 30–100)
BASOPHILS # BLD AUTO: 0.05 X10*3/UL (ref 0–0.1)
BASOPHILS NFR BLD AUTO: 1.2 %
EOSINOPHIL # BLD AUTO: 0.37 X10*3/UL (ref 0–0.4)
EOSINOPHIL NFR BLD AUTO: 8.7 %
ERYTHROCYTE [DISTWIDTH] IN BLOOD BY AUTOMATED COUNT: 13.2 % (ref 11.5–14.5)
HCT VFR BLD AUTO: 45.8 % (ref 36–46)
HGB BLD-MCNC: 14.4 G/DL (ref 12–16)
IMM GRANULOCYTES # BLD AUTO: 0.01 X10*3/UL (ref 0–0.5)
IMM GRANULOCYTES NFR BLD AUTO: 0.2 % (ref 0–0.9)
LYMPHOCYTES # BLD AUTO: 1.12 X10*3/UL (ref 0.8–3)
LYMPHOCYTES NFR BLD AUTO: 26.4 %
MCH RBC QN AUTO: 30 PG (ref 26–34)
MCHC RBC AUTO-ENTMCNC: 31.4 G/DL (ref 32–36)
MCV RBC AUTO: 95 FL (ref 80–100)
MONOCYTES # BLD AUTO: 0.44 X10*3/UL (ref 0.05–0.8)
MONOCYTES NFR BLD AUTO: 10.4 %
NEUTROPHILS # BLD AUTO: 2.26 X10*3/UL (ref 1.6–5.5)
NEUTROPHILS NFR BLD AUTO: 53.1 %
NRBC BLD-RTO: 0 /100 WBCS (ref 0–0)
PLATELET # BLD AUTO: 221 X10*3/UL (ref 150–450)
RBC # BLD AUTO: 4.8 X10*6/UL (ref 4–5.2)
T4 FREE SERPL-MCNC: 0.81 NG/DL (ref 0.61–1.12)
TSH SERPL-ACNC: 4.75 MIU/L (ref 0.44–3.98)
WBC # BLD AUTO: 4.3 X10*3/UL (ref 4.4–11.3)

## 2024-02-12 PROCEDURE — 82306 VITAMIN D 25 HYDROXY: CPT

## 2024-02-12 PROCEDURE — 85025 COMPLETE CBC W/AUTO DIFF WBC: CPT

## 2024-02-12 PROCEDURE — 84443 ASSAY THYROID STIM HORMONE: CPT

## 2024-02-12 PROCEDURE — 36415 COLL VENOUS BLD VENIPUNCTURE: CPT

## 2024-02-12 PROCEDURE — 84439 ASSAY OF FREE THYROXINE: CPT

## 2024-02-12 NOTE — TELEPHONE ENCOUNTER
----- Message from Connie Capone MD sent at 2/12/2024  2:25 PM EST -----  Please, inform the pt that the WBC is improved, but still minimally decreased, Vit D level is back to normal. Thyroid gland function mildly improved, but still off the normal range. Pt should FU  in the office within the next 1-2 weeks or sooner if needed.  Thank you

## 2024-02-22 ENCOUNTER — ANESTHESIA EVENT (OUTPATIENT)
Dept: GASTROENTEROLOGY | Facility: EXTERNAL LOCATION | Age: 74
End: 2024-02-22

## 2024-02-27 ENCOUNTER — OFFICE VISIT (OUTPATIENT)
Dept: PRIMARY CARE | Facility: CLINIC | Age: 74
End: 2024-02-27
Payer: MEDICARE

## 2024-02-27 VITALS
BODY MASS INDEX: 30.65 KG/M2 | DIASTOLIC BLOOD PRESSURE: 74 MMHG | TEMPERATURE: 97.4 F | SYSTOLIC BLOOD PRESSURE: 109 MMHG | HEART RATE: 68 BPM | HEIGHT: 63 IN | WEIGHT: 173 LBS

## 2024-02-27 DIAGNOSIS — Z13.1 DIABETES MELLITUS SCREENING: ICD-10-CM

## 2024-02-27 DIAGNOSIS — Z71.2 ENCOUNTER TO DISCUSS TEST RESULTS: ICD-10-CM

## 2024-02-27 DIAGNOSIS — E03.8 SUBCLINICAL HYPOTHYROIDISM: ICD-10-CM

## 2024-02-27 DIAGNOSIS — Z76.0 ENCOUNTER FOR MEDICATION REFILL: ICD-10-CM

## 2024-02-27 DIAGNOSIS — R79.89 ABNORMAL TSH: Primary | ICD-10-CM

## 2024-02-27 DIAGNOSIS — D72.818 OTHER DECREASED WHITE BLOOD CELL (WBC) COUNT: ICD-10-CM

## 2024-02-27 PROBLEM — E55.9 VITAMIN D INSUFFICIENCY: Status: RESOLVED | Noted: 2023-11-28 | Resolved: 2024-02-27

## 2024-02-27 PROBLEM — J01.00 ACUTE MAXILLARY SINUSITIS: Status: RESOLVED | Noted: 2023-09-27 | Resolved: 2024-02-27

## 2024-02-27 PROCEDURE — 1036F TOBACCO NON-USER: CPT | Performed by: INTERNAL MEDICINE

## 2024-02-27 PROCEDURE — 1159F MED LIST DOCD IN RCRD: CPT | Performed by: INTERNAL MEDICINE

## 2024-02-27 PROCEDURE — 99214 OFFICE O/P EST MOD 30 MIN: CPT | Performed by: INTERNAL MEDICINE

## 2024-02-27 PROCEDURE — 1126F AMNT PAIN NOTED NONE PRSNT: CPT | Performed by: INTERNAL MEDICINE

## 2024-02-27 PROCEDURE — 1160F RVW MEDS BY RX/DR IN RCRD: CPT | Performed by: INTERNAL MEDICINE

## 2024-02-27 RX ORDER — LEVOTHYROXINE SODIUM 25 UG/1
12.5 TABLET ORAL
Qty: 45 TABLET | Refills: 0 | Status: SHIPPED | OUTPATIENT
Start: 2024-02-27 | End: 2024-05-27

## 2024-02-27 ASSESSMENT — PATIENT HEALTH QUESTIONNAIRE - PHQ9
2. FEELING DOWN, DEPRESSED OR HOPELESS: NOT AT ALL
SUM OF ALL RESPONSES TO PHQ9 QUESTIONS 1 AND 2: 0
1. LITTLE INTEREST OR PLEASURE IN DOING THINGS: NOT AT ALL

## 2024-02-27 NOTE — PROGRESS NOTES
"Subjective   Patient ID: Lauren Patel is a 73 y.o. female who presents for Follow-up and Results.    HPI Pt is a pleasant 73 y.o. CF who was seen and evaluated during the FU OV. Pt states that overall she feels well. Pt states that she run out of the thyroid medication for about a month. Pt recently had a BW done and would like to discuss the results. During the clinical encounter pt denies fever, chills, no SOB, no chest pain/tightness, no abdominal pain, no N/V/D reported, no change of urination reported. Pt denies any other health concerns during this visit.  Sohx: reviewed  PMHx and Fhx: reviewed      Review of Systems  12 Systems have been reviewed as follows:   Constitutional: no fever, no chills  Eyes: no eyesight problems, no eye redness, no eye pain, no blurred vision  ENT: no ear pain or sore throat, no nasal discharge or congestion, no sinus pressure  Cardiovascular: no chest pain or tightness, no SOB, the heart rate was not fast or slow  Respiratory: no cough, no dyspnea with exertion or with rest, no wheezing  Gastrointestinal: no abdominal pain, no constipation or diarrhea, no heartburn, no nausea or vomiting  Genitourinary: no urine frequency, no dysuria, no urinary urgency, no urinary incontinence or retention  Musculoskeletal: no back pain, no arthralgia, no joint swelling or stiffness, no muscle weakness  Integumentary: no skin lesions or rash  Neurological: no headaches, no dizziness or fainting, no limb weakness  Psychiatric: no mood changes, no anxiety or depression, no suicidal thoughts  Endocrine: no weight change, no temperature intolerance, no change of appetite  Hematologic/Lymphatic: no easy bruising or bleeding  Objective   /74 (BP Location: Left arm, Patient Position: Sitting, BP Cuff Size: Adult)   Pulse 68   Temp 36.3 °C (97.4 °F)   Ht 1.6 m (5' 3\")   Wt 78.5 kg (173 lb)   BMI 30.65 kg/m²     Physical Exam  Constitutional: well hydrated, well nourished, no acute distress. " Vital signs reviewed  Head and face: normocephalic, atraumatic  Eyes: no erythema, edema or visible abnormalities of conjunctiva or lids. Pupils are equal, round and reactive to light. Extraocular movement normal  ENT: external ears without deformities. Pt has hearing aid b/l  Neck: full ROM, no adenopathy, neck was supple, thyroid gland not enlarged, no palpable nodules  Pulmonary: normal respiratory rate and effort. Lungs are clear to auscultation, no rales,no rhonchi or wheezing  Cardiovascular: RRR, normal S1 and S2, no murmur, no gallop, posterior tib. pulse normal 2+ bilaterally, no lower extremity edema  Abdomen: soft, non tender on palpation, not distended, normal BS in all 4 quadrants  Musculoskeletal: no joint swelling, normal movements of all 4 extremities. Gait and station: normal, Digits and nails: normal without clubbing  Skin: normal color, no rash  Neurologic: Cranial nerves: CN II: visual acuity intact. Source: acuity reported by patient. Pupils reactive to light with normal accommodation. Right and left pupil normal CN III, IV and VI: the EO movements were intact. Deep tendon reflexes: were 2+ and symmetric:  R and L patella.  Sensory exam: normal light to touch  Psychiatric: Mood and affect: normal, Alert and Oriented x 3  Lymphatic: no cervical lymphadenopathy  Assessment/Plan   Encounter to discuss the recent BW results:  I shared and discuss the recent BW results with the pt in details. Pt verbalized understanding  Hx of vit D insufficiency: resolved  Abnormal TSH level, subclinical hypothyroidism, persistent abnormal level of TSH, Pt was off the medication. Will refill levothyroxine 12.5 mg PO daily x 90 days. Will re check TSH/T 4 in 3 months  Decreased WBC: level pf WBC is trending up. Will recheck CBC in 4 months  DM screening: HGa1C was ordered  BMI of 30, consistent with obesity WHO class I: please continue with the lifestyle modifications  Pt has upcoming EGD/ colonoscopy  Pt will need  AWV   Plan was d/c with the pt in details, verbalized understanding, agreed  Please follow up with PCP as planned or sooner if needed

## 2024-03-04 ENCOUNTER — ANESTHESIA (OUTPATIENT)
Dept: GASTROENTEROLOGY | Facility: EXTERNAL LOCATION | Age: 74
End: 2024-03-04

## 2024-03-04 ENCOUNTER — HOSPITAL ENCOUNTER (OUTPATIENT)
Dept: GASTROENTEROLOGY | Facility: EXTERNAL LOCATION | Age: 74
Discharge: HOME | End: 2024-03-04
Payer: MEDICARE

## 2024-03-04 VITALS
TEMPERATURE: 97.9 F | OXYGEN SATURATION: 100 % | SYSTOLIC BLOOD PRESSURE: 120 MMHG | DIASTOLIC BLOOD PRESSURE: 65 MMHG | RESPIRATION RATE: 17 BRPM | HEART RATE: 65 BPM

## 2024-03-04 DIAGNOSIS — K22.70 BARRETT'S ESOPHAGUS WITHOUT DYSPLASIA: Primary | ICD-10-CM

## 2024-03-04 DIAGNOSIS — Z12.11 COLON CANCER SCREENING: ICD-10-CM

## 2024-03-04 DIAGNOSIS — K21.00 GASTROESOPHAGEAL REFLUX DISEASE WITH ESOPHAGITIS WITHOUT HEMORRHAGE: ICD-10-CM

## 2024-03-04 PROCEDURE — 0753T DGTZ GLS MCRSCP SLD LEVEL IV: CPT | Mod: TC,ELYLAB,WESLAB | Performed by: INTERNAL MEDICINE

## 2024-03-04 PROCEDURE — 43239 EGD BIOPSY SINGLE/MULTIPLE: CPT | Performed by: INTERNAL MEDICINE

## 2024-03-04 PROCEDURE — 88305 TISSUE EXAM BY PATHOLOGIST: CPT | Performed by: PATHOLOGY

## 2024-03-04 PROCEDURE — 45380 COLONOSCOPY AND BIOPSY: CPT | Performed by: INTERNAL MEDICINE

## 2024-03-04 RX ORDER — PANTOPRAZOLE SODIUM 40 MG/1
40 TABLET, DELAYED RELEASE ORAL 2 TIMES DAILY
Qty: 60 TABLET | Refills: 3 | Status: SHIPPED | OUTPATIENT
Start: 2024-03-04

## 2024-03-04 RX ORDER — LIDOCAINE HYDROCHLORIDE 20 MG/ML
INJECTION, SOLUTION INFILTRATION; PERINEURAL AS NEEDED
Status: DISCONTINUED | OUTPATIENT
Start: 2024-03-04 | End: 2024-03-04

## 2024-03-04 RX ORDER — PROPOFOL 10 MG/ML
INJECTION, EMULSION INTRAVENOUS AS NEEDED
Status: DISCONTINUED | OUTPATIENT
Start: 2024-03-04 | End: 2024-03-04

## 2024-03-04 RX ORDER — SODIUM CHLORIDE, SODIUM LACTATE, POTASSIUM CHLORIDE, CALCIUM CHLORIDE 600; 310; 30; 20 MG/100ML; MG/100ML; MG/100ML; MG/100ML
20 INJECTION, SOLUTION INTRAVENOUS CONTINUOUS
Status: DISCONTINUED | OUTPATIENT
Start: 2024-03-04 | End: 2024-03-05 | Stop reason: HOSPADM

## 2024-03-04 RX ADMIN — PROPOFOL 50 MG: 10 INJECTION, EMULSION INTRAVENOUS at 10:46

## 2024-03-04 RX ADMIN — SODIUM CHLORIDE, SODIUM LACTATE, POTASSIUM CHLORIDE, CALCIUM CHLORIDE: 600; 310; 30; 20 INJECTION, SOLUTION INTRAVENOUS at 10:36

## 2024-03-04 RX ADMIN — LIDOCAINE HYDROCHLORIDE 4 ML: 20 INJECTION, SOLUTION INFILTRATION; PERINEURAL at 10:36

## 2024-03-04 RX ADMIN — PROPOFOL 50 MG: 10 INJECTION, EMULSION INTRAVENOUS at 10:42

## 2024-03-04 RX ADMIN — PROPOFOL 100 MG: 10 INJECTION, EMULSION INTRAVENOUS at 10:36

## 2024-03-04 RX ADMIN — PROPOFOL 50 MG: 10 INJECTION, EMULSION INTRAVENOUS at 10:39

## 2024-03-04 RX ADMIN — PROPOFOL 50 MG: 10 INJECTION, EMULSION INTRAVENOUS at 10:51

## 2024-03-04 SDOH — HEALTH STABILITY: MENTAL HEALTH: CURRENT SMOKER: 0

## 2024-03-04 ASSESSMENT — PAIN - FUNCTIONAL ASSESSMENT
PAIN_FUNCTIONAL_ASSESSMENT: 0-10

## 2024-03-04 ASSESSMENT — COLUMBIA-SUICIDE SEVERITY RATING SCALE - C-SSRS
2. HAVE YOU ACTUALLY HAD ANY THOUGHTS OF KILLING YOURSELF?: NO
1. IN THE PAST MONTH, HAVE YOU WISHED YOU WERE DEAD OR WISHED YOU COULD GO TO SLEEP AND NOT WAKE UP?: NO
6. HAVE YOU EVER DONE ANYTHING, STARTED TO DO ANYTHING, OR PREPARED TO DO ANYTHING TO END YOUR LIFE?: NO

## 2024-03-04 ASSESSMENT — PAIN SCALES - GENERAL
PAINLEVEL_OUTOF10: 0 - NO PAIN
PAIN_LEVEL: 0
PAINLEVEL_OUTOF10: 0 - NO PAIN
PAINLEVEL_OUTOF10: 0 - NO PAIN

## 2024-03-04 NOTE — ANESTHESIA POSTPROCEDURE EVALUATION
Patient: Lauren Patel    Procedure Summary       Date: 03/04/24 Room / Location: [facility]    Anesthesia Start: 1032 Anesthesia Stop:     Procedures:       EGD      COLONOSCOPY Diagnosis:       Welch's esophagus without dysplasia      Colon cancer screening      Welch's esophagus without dysplasia    Scheduled Providers: Ludwin Hartman MD; Kaylin Carlson RN Responsible Provider: MARYCHUY Orta    Anesthesia Type: MAC ASA Status: 2            Anesthesia Type: MAC    Vitals Value Taken Time   /58 03/04/24 1100   Temp 36.6 03/04/24 1100   Pulse 68 03/04/24 1100   Resp 15 03/04/24 1100   SpO2 99 03/04/24 1100       Anesthesia Post Evaluation    Patient location during evaluation: bedside  Patient participation: complete - patient participated  Level of consciousness: awake  Pain score: 0  Pain management: adequate  Airway patency: patent  Cardiovascular status: acceptable  Respiratory status: acceptable  Hydration status: acceptable  Postoperative Nausea and Vomiting: none        There were no known notable events for this encounter.

## 2024-03-04 NOTE — ANESTHESIA PREPROCEDURE EVALUATION
Patient: Lauren Patel    Procedure Information       Date/Time: 03/04/24 1100    Scheduled providers: Ludwin Hartman MD; Kaylin Carlson RN    Procedures:       EGD      COLONOSCOPY    Location: [facility]            Relevant Problems   Anesthesia (within normal limits)      Cardiovascular (within normal limits)      Endocrine   (+) Subclinical hypothyroidism      GI   (+) GERD (gastroesophageal reflux disease)      /Renal   (+) Stage 3a chronic kidney disease (CMS/HCC)      Neuro/Psych (within normal limits)      Pulmonary (within normal limits)      GI/Hepatic (within normal limits)      Hematology (within normal limits)      Musculoskeletal (within normal limits)      Eyes, Ears, Nose, and Throat   (+) Hearing loss, bilateral      Infectious Disease (within normal limits)       Clinical information reviewed:   Tobacco  Allergies  Meds   Med Hx  Surg Hx  OB Status  Fam Hx  Soc   Hx        NPO Detail:  NPO/Void Status  Date of Last Liquid: 03/04/24  Time of Last Liquid: 0600  Date of Last Solid: 03/03/24  Time of Last Solid: 1000  Last Intake Type: Clear fluids         Physical Exam    Airway  Mallampati: II  TM distance: >3 FB  Neck ROM: full     Cardiovascular - normal exam  Rhythm: regular  Rate: normal     Dental - normal exam     Pulmonary - normal exam  Breath sounds clear to auscultation     Abdominal            Anesthesia Plan    History of general anesthesia?: yes  History of complications of general anesthesia?: no    ASA 2     MAC     The patient is not a current smoker.    intravenous induction   Anesthetic plan and risks discussed with patient.    Plan discussed with CRNA.

## 2024-03-04 NOTE — DISCHARGE INSTRUCTIONS
Patient Instructions Post Procedure      The anesthetics, sedatives or narcotics which were given to you today will be acting in your body for the next 24 hours, so you might feel a little sleepy or groggy.  This feeling should slowly wear off. Carefully read and follow the instructions.     You received sedation today:  - Do not drive or operate any machinery or power tools of any kind.   - No alcoholic beverages today, not even beer or wine.  - Do not make any important decisions or sign any legal documents.  - No over the counter medications that contain alcohol or that may cause drowsiness.    While it is common to experience mild to moderate abdominal distention, gas, or belching after your procedure, if any of these symptoms occur following discharge from the GI Lab or within one week of having your procedure, call the [facility] to be advised whether a visit to your nearest Urgent Care or Emergency Department is indicated.  Take this paper with you if you go.   - If you develop an allergic reaction to the medications that were given during your procedure such as difficulty breathing, rash, hives, severe nausea, vomiting or lightheadedness.  - If you experience chest pain, shortness of breath, severe abdominal pain, fevers and chills.  -If you develop signs and symptoms of bleeding such as blood in your spit, if your stools turn black, tarry, or bloody  - If you have not urinated within 8 hours following your procedure.  - If your IV site becomes painful, red, inflamed, or looks infected.    If you received a biopsy/polypectomy/sphincterotomy the following instructions apply below:  __ Do not use Aspirin containing products, non-steroidal medications or anti-coagulants for one week following your procedure. (Examples of these types of medications are: Advil, Arthrotec, Aleve, Coumadin, Ecotrin, Heparin, Ibuprofen, Indocin, Motrin, Naprosyn, Nuprin, Plavix, Vioxx, and Voltarin, or their generic  forms.  This list is not all-inclusive.  Check with your physician or pharmacist before resuming medications.)   __ Eat a soft diet today.  Avoid foods that are poorly digested for the next 24 hours.  These foods would include: nuts, beans, lettuce, red meats, and fried foods. Start with liquids and advance your diet as tolerated, gradually work up to eating solids.   __ Do not have a Barium Study or Enema for one week.    Your physician recommends the additional following instructions:    -You have a contact number available for emergencies. The signs and symptoms of potential delayed complications were discussed with you. You may return to normal activities tomorrow.  -Resume your previous diet or other if specified.  -Continue your present medications.   -We are waiting for your pathology results, if applicable.  -The findings and recommendations have been discussed with you and/or family.  - Please see Medication Reconciliation Form for new medication/medications prescribed.     If you experience any problems or have any questions following discharge from the GI Lab, please call: [phone] from 7 am- 4:30 pm.  In the event of an emergency please go to the closest Emergency Department or call Dr. Hartman after hours [phone]

## 2024-03-12 LAB
LABORATORY COMMENT REPORT: NORMAL
PATH REPORT.COMMENTS IMP SPEC: NORMAL
PATH REPORT.FINAL DX SPEC: NORMAL
PATH REPORT.GROSS SPEC: NORMAL
PATH REPORT.TOTAL CANCER: NORMAL

## 2024-03-24 NOTE — RESULT ENCOUNTER NOTE
Pathology results from recent upper endoscopy show long segment of Welch;s esophagus with reflux associated ulceration. No other concerning features were seen.   Please continue the acid suppressive medication as prescribed and follow up in my office in next 2-3 months.  Sincerely,   Ludwin Hartman MD

## 2024-05-14 ENCOUNTER — LAB (OUTPATIENT)
Dept: LAB | Facility: LAB | Age: 74
End: 2024-05-14
Payer: MEDICARE

## 2024-05-14 DIAGNOSIS — D72.818 OTHER DECREASED WHITE BLOOD CELL (WBC) COUNT: ICD-10-CM

## 2024-05-14 DIAGNOSIS — E03.8 SUBCLINICAL HYPOTHYROIDISM: ICD-10-CM

## 2024-05-14 DIAGNOSIS — R79.89 ABNORMAL TSH: ICD-10-CM

## 2024-05-14 DIAGNOSIS — Z13.1 DIABETES MELLITUS SCREENING: ICD-10-CM

## 2024-05-14 LAB
BASOPHILS # BLD AUTO: 0.04 X10*3/UL (ref 0–0.1)
BASOPHILS NFR BLD AUTO: 0.9 %
EOSINOPHIL # BLD AUTO: 0.33 X10*3/UL (ref 0–0.4)
EOSINOPHIL NFR BLD AUTO: 7.2 %
ERYTHROCYTE [DISTWIDTH] IN BLOOD BY AUTOMATED COUNT: 13.4 % (ref 11.5–14.5)
EST. AVERAGE GLUCOSE BLD GHB EST-MCNC: 117 MG/DL
HBA1C MFR BLD: 5.7 %
HCT VFR BLD AUTO: 41.9 % (ref 36–46)
HGB BLD-MCNC: 13.1 G/DL (ref 12–16)
IMM GRANULOCYTES # BLD AUTO: 0.01 X10*3/UL (ref 0–0.5)
IMM GRANULOCYTES NFR BLD AUTO: 0.2 % (ref 0–0.9)
LYMPHOCYTES # BLD AUTO: 1.13 X10*3/UL (ref 0.8–3)
LYMPHOCYTES NFR BLD AUTO: 24.7 %
MCH RBC QN AUTO: 30.1 PG (ref 26–34)
MCHC RBC AUTO-ENTMCNC: 31.3 G/DL (ref 32–36)
MCV RBC AUTO: 96 FL (ref 80–100)
MONOCYTES # BLD AUTO: 0.35 X10*3/UL (ref 0.05–0.8)
MONOCYTES NFR BLD AUTO: 7.6 %
NEUTROPHILS # BLD AUTO: 2.72 X10*3/UL (ref 1.6–5.5)
NEUTROPHILS NFR BLD AUTO: 59.4 %
NRBC BLD-RTO: 0 /100 WBCS (ref 0–0)
PLATELET # BLD AUTO: 209 X10*3/UL (ref 150–450)
RBC # BLD AUTO: 4.35 X10*6/UL (ref 4–5.2)
TSH SERPL-ACNC: 2.55 MIU/L (ref 0.44–3.98)
WBC # BLD AUTO: 4.6 X10*3/UL (ref 4.4–11.3)

## 2024-05-14 PROCEDURE — 85025 COMPLETE CBC W/AUTO DIFF WBC: CPT

## 2024-05-14 PROCEDURE — 83036 HEMOGLOBIN GLYCOSYLATED A1C: CPT

## 2024-05-14 PROCEDURE — 36415 COLL VENOUS BLD VENIPUNCTURE: CPT

## 2024-05-14 PROCEDURE — 84443 ASSAY THYROID STIM HORMONE: CPT

## 2024-05-16 ENCOUNTER — TELEPHONE (OUTPATIENT)
Dept: PRIMARY CARE | Facility: CLINIC | Age: 74
End: 2024-05-16
Payer: MEDICARE

## 2024-05-16 NOTE — TELEPHONE ENCOUNTER
----- Message from Connie Capone MD sent at 5/15/2024 12:07 PM EDT -----  Please, inform the pt that the recent BW showed normal thyroid gland function, WBC level is back to normal. HGBa1C (the special blood test for diabetes screening) showed pre diabetes. Pt should follow with the low carb diet. Please FU in the office within the next 2-3 months or sooner if needed.

## 2024-05-17 ENCOUNTER — APPOINTMENT (OUTPATIENT)
Dept: PRIMARY CARE | Facility: CLINIC | Age: 74
End: 2024-05-17
Payer: MEDICARE

## 2024-06-11 ENCOUNTER — TELEPHONE (OUTPATIENT)
Dept: PRIMARY CARE | Facility: CLINIC | Age: 74
End: 2024-06-11
Payer: MEDICARE

## 2024-06-20 ENCOUNTER — APPOINTMENT (OUTPATIENT)
Dept: GASTROENTEROLOGY | Facility: CLINIC | Age: 74
End: 2024-06-20
Payer: MEDICARE

## 2024-06-20 VITALS
SYSTOLIC BLOOD PRESSURE: 124 MMHG | WEIGHT: 167.6 LBS | OXYGEN SATURATION: 95 % | HEIGHT: 63 IN | BODY MASS INDEX: 29.7 KG/M2 | HEART RATE: 60 BPM | DIASTOLIC BLOOD PRESSURE: 74 MMHG | RESPIRATION RATE: 18 BRPM

## 2024-06-20 DIAGNOSIS — K22.70 BARRETT'S ESOPHAGUS WITHOUT DYSPLASIA: ICD-10-CM

## 2024-06-20 DIAGNOSIS — K21.00 GASTROESOPHAGEAL REFLUX DISEASE WITH ESOPHAGITIS WITHOUT HEMORRHAGE: ICD-10-CM

## 2024-06-20 PROCEDURE — 1036F TOBACCO NON-USER: CPT | Performed by: INTERNAL MEDICINE

## 2024-06-20 PROCEDURE — 99214 OFFICE O/P EST MOD 30 MIN: CPT | Performed by: INTERNAL MEDICINE

## 2024-06-20 PROCEDURE — 1159F MED LIST DOCD IN RCRD: CPT | Performed by: INTERNAL MEDICINE

## 2024-06-20 RX ORDER — PANTOPRAZOLE SODIUM 40 MG/1
40 TABLET, DELAYED RELEASE ORAL 2 TIMES DAILY
Qty: 60 TABLET | Refills: 3 | Status: SHIPPED | OUTPATIENT
Start: 2024-06-20

## 2024-06-20 ASSESSMENT — ENCOUNTER SYMPTOMS
COLOR CHANGE: 0
FEVER: 0
ANAL BLEEDING: 0
DIARRHEA: 0
ABDOMINAL PAIN: 0
CONSTIPATION: 0
CHILLS: 0
BLOOD IN STOOL: 0
SHORTNESS OF BREATH: 0
RECTAL PAIN: 0
NAUSEA: 0
UNEXPECTED WEIGHT CHANGE: 0
ABDOMINAL DISTENTION: 0
VOMITING: 0
TROUBLE SWALLOWING: 0

## 2024-06-20 NOTE — PATIENT INSTRUCTIONS
Welch's esophagus without dysplasia  -     pantoprazole (ProtoNix) 40 mg EC tablet; Take 1 tablet (40 mg) by mouth 2 times a day. Do not crush, chew, or split.  Gastroesophageal reflux disease with esophagitis without hemorrhage  -     pantoprazole (ProtoNix) 40 mg EC tablet; Take 1 tablet (40 mg) by mouth 2 times a day. Do not crush, chew, or split.  Lifestyle modifications.  Follow up in 1 year or earlier as needed.

## 2024-06-20 NOTE — PROGRESS NOTES
Subjective   Patient ID: Lauren Patel is a 73 y.o. female who presents for Follow-up (Procedure follow up. Wants to discuss getting liquid carafate for quick relief during an episode of heartburn).  HPI  Patient is seen in follow-up after recent upper endoscopy which showed 5 cm of Welch's esophagus and large hiatal hernia.  Her symptoms are overall quite well-controlled with pantoprazole 40 mg oral twice daily.  However she has noticed whenever she eats snacks late at night her symptoms return.  Current Outpatient Medications on File Prior to Visit   Medication Sig Dispense Refill    acetaminophen (Tylenol 8 HOUR) 650 mg ER tablet Take 1 tablet (650 mg) by mouth every 8 hours if needed for mild pain (1 - 3). Do not crush, chew, or split.      famotidine (Pepcid) 20 mg tablet Take by mouth.      melatonin 5 mg tablet,disintegrating Take by mouth.      multivitamin with minerals iron-free (multivit-iron-minerals-folic acid) Take by mouth.      [DISCONTINUED] pantoprazole (ProtoNix) 40 mg EC tablet Take 1 tablet (40 mg) by mouth 2 times a day. Do not crush, chew, or split. 60 tablet 3    levothyroxine (Synthroid, Levoxyl) 25 mcg tablet Take 0.5 tablets (12.5 mcg) by mouth once daily in the morning. Take before meals. 45 tablet 0    [DISCONTINUED] sodium,potassium,mag sulfates (Suprep) 17.5-3.13-1.6 gram recon soln solution Take one bottle twice as directed by the prep instructions 354 mL 0     No current facility-administered medications on file prior to visit.        Review of Systems   Constitutional:  Negative for chills, fever and unexpected weight change.   HENT:  Negative for trouble swallowing.    Respiratory:  Negative for shortness of breath.    Cardiovascular:  Negative for chest pain.   Gastrointestinal:  Negative for abdominal distention, abdominal pain, anal bleeding, blood in stool, constipation, diarrhea, nausea, rectal pain and vomiting.   Skin:  Negative for color change.       Objective  "  Physical Exam  Constitutional:       General: She is awake.      Appearance: Normal appearance.   HENT:      Head: Normocephalic and atraumatic.      Nose: Nose normal.      Mouth/Throat:      Mouth: Mucous membranes are moist.   Eyes:      Pupils: Pupils are equal, round, and reactive to light.   Pulmonary:      Effort: Pulmonary effort is normal.   Neurological:      Mental Status: She is alert and oriented to person, place, and time. Mental status is at baseline.   Psychiatric:         Attention and Perception: Attention and perception normal.         Mood and Affect: Mood normal.         Behavior: Behavior normal.       /74 (BP Location: Left arm, Patient Position: Sitting, BP Cuff Size: Adult)   Pulse 60   Resp 18   Ht 1.6 m (5' 3\")   Wt 76 kg (167 lb 9.6 oz)   SpO2 95%   BMI 29.69 kg/m²      Lab Results   Component Value Date    WBC 4.6 05/14/2024    HGB 13.1 05/14/2024    HCT 41.9 05/14/2024    MCV 96 05/14/2024     05/14/2024           No lab exists for component: \"LABALBU\"    No results found for: \"AFP\"  Lab Results   Component Value Date    TSH 2.55 05/14/2024     Colonoscopy  Order# [id number]  Reading physician: Ludwin Hartman MD Ordering provider: Ludwin Hartman MD Study date: 3/4/24     Result Information    Status: Final result (Exam End: 3/4/2024 10:58) Provider Status: Open     Result Text    Result Text   Impression  The cecum appeared normal.  Subcentimeter polyp in the ascending colon was removed with cold forceps biopsy  Diverticulosis in the descending colon and sigmoid colon  Small hemorrhoids        Findings  The cecum appeared normal.;  One sessile polyp measuring smaller than 5 mm in the ascending colon; performed cold forceps biopsy with complete en bloc removal  Multiple medium diverticula in the descending colon and sigmoid colon  Internal small hemorrhoids        Recommendation    Await pathology results     No further screening colonoscopies necessary     Age " greater than 65        Esophagogastroduodenoscopy (EGD)  Order# [id number]  Reading physician: Ludwin Hartman MD Ordering provider: Ludwin Hartman MD Study date: 3/4/24     Result Information    Status: Final result (Exam End: 3/4/2024 10:58) Provider Status: Open     Result Text    Result Text   Impression  The duodenal bulb and 2nd part of the duodenum appeared normal.  The stomach appeared normal.  5 cm hiatal hernia  C4M5 Welch's esophagus; performed four-quadrant cold forceps biopsy every 2 cm for dysplasia screening  Grade C esophagitis in the middle third of the esophagus        Findings  The duodenal bulb and 2nd part of the duodenum appeared normal.  The stomach appeared normal.  5 cm hiatal hernia  C4M5 Welch's esophagus observed with no associated lesion without using a distal attachment cap; performed four-quadrant cold forceps biopsy every 2 cm for dysplasia screening  Moderate, localized grade C esophagitis with mucosal breaks measuring 5 mm or more, continuous between folds, covering less than 75% of the circumference, showing atrophic mucosa with erosion in the middle third of the esophagus        Recommendation    Await pathology results     Follow up with me in clinic     In 3 months.   Lifestyle modifications to reduce acid reflux.   Increase pantoprazole to twice daily.                   Assessment/Plan   Diagnoses and all orders for this visit:  Welch's esophagus without dysplasia  -     pantoprazole (ProtoNix) 40 mg EC tablet; Take 1 tablet (40 mg) by mouth 2 times a day. Do not crush, chew, or split.  Gastroesophageal reflux disease with esophagitis without hemorrhage  -     pantoprazole (ProtoNix) 40 mg EC tablet; Take 1 tablet (40 mg) by mouth 2 times a day. Do not crush, chew, or split.  Lifestyle modifications.  Repeat upper endoscopy for long segment Welch's esophagus in 3 years.  Follow up in 1 year or earlier as needed.

## 2024-06-25 ENCOUNTER — APPOINTMENT (OUTPATIENT)
Dept: PRIMARY CARE | Facility: CLINIC | Age: 74
End: 2024-06-25
Payer: MEDICARE

## 2024-06-25 VITALS
HEART RATE: 65 BPM | WEIGHT: 169.4 LBS | HEIGHT: 63 IN | DIASTOLIC BLOOD PRESSURE: 74 MMHG | TEMPERATURE: 97.7 F | BODY MASS INDEX: 30.02 KG/M2 | SYSTOLIC BLOOD PRESSURE: 117 MMHG

## 2024-06-25 DIAGNOSIS — R73.03 PRE-DIABETES: ICD-10-CM

## 2024-06-25 DIAGNOSIS — R21 SKIN RASH: ICD-10-CM

## 2024-06-25 DIAGNOSIS — Z76.0 ENCOUNTER FOR MEDICATION REFILL: ICD-10-CM

## 2024-06-25 DIAGNOSIS — E03.8 SUBCLINICAL HYPOTHYROIDISM: ICD-10-CM

## 2024-06-25 DIAGNOSIS — F43.9 STRESS AT HOME: ICD-10-CM

## 2024-06-25 DIAGNOSIS — E66.09 CLASS 1 OBESITY DUE TO EXCESS CALORIES WITH SERIOUS COMORBIDITY AND BODY MASS INDEX (BMI) OF 34.0 TO 34.9 IN ADULT: ICD-10-CM

## 2024-06-25 DIAGNOSIS — Z71.2 ENCOUNTER TO DISCUSS TEST RESULTS: Primary | ICD-10-CM

## 2024-06-25 DIAGNOSIS — R79.89 ABNORMAL TSH: ICD-10-CM

## 2024-06-25 PROBLEM — E66.811 CLASS 1 OBESITY DUE TO EXCESS CALORIES WITH SERIOUS COMORBIDITY AND BODY MASS INDEX (BMI) OF 34.0 TO 34.9 IN ADULT: Status: ACTIVE | Noted: 2024-06-25

## 2024-06-25 PROBLEM — N18.31 STAGE 3A CHRONIC KIDNEY DISEASE (MULTI): Status: RESOLVED | Noted: 2023-11-07 | Resolved: 2024-06-25

## 2024-06-25 PROCEDURE — 99215 OFFICE O/P EST HI 40 MIN: CPT | Performed by: INTERNAL MEDICINE

## 2024-06-25 PROCEDURE — 1160F RVW MEDS BY RX/DR IN RCRD: CPT | Performed by: INTERNAL MEDICINE

## 2024-06-25 PROCEDURE — 1159F MED LIST DOCD IN RCRD: CPT | Performed by: INTERNAL MEDICINE

## 2024-06-25 PROCEDURE — 1036F TOBACCO NON-USER: CPT | Performed by: INTERNAL MEDICINE

## 2024-06-25 RX ORDER — LEVOTHYROXINE SODIUM 25 UG/1
12.5 TABLET ORAL
Qty: 45 TABLET | Refills: 1 | Status: SHIPPED | OUTPATIENT
Start: 2024-06-25 | End: 2024-12-22

## 2024-06-25 RX ORDER — BETAMETHASONE VALERATE 1 MG/G
CREAM TOPICAL 2 TIMES DAILY
Qty: 15 G | Refills: 0 | Status: SHIPPED | OUTPATIENT
Start: 2024-06-25 | End: 2024-07-02

## 2024-06-25 RX ORDER — HYDROXYZINE HYDROCHLORIDE 25 MG/1
25 TABLET, FILM COATED ORAL 2 TIMES DAILY PRN
Qty: 60 TABLET | Refills: 0 | Status: SHIPPED | OUTPATIENT
Start: 2024-06-25 | End: 2024-07-25

## 2024-06-25 ASSESSMENT — PATIENT HEALTH QUESTIONNAIRE - PHQ9
1. LITTLE INTEREST OR PLEASURE IN DOING THINGS: NOT AT ALL
SUM OF ALL RESPONSES TO PHQ9 QUESTIONS 1 AND 2: 0
2. FEELING DOWN, DEPRESSED OR HOPELESS: NOT AT ALL

## 2024-06-25 NOTE — PROGRESS NOTES
"Subjective   Patient ID: Lauren Patel is a 73 y.o. female who presents for Follow-up and Results.    HPI Pt is a pleasant 73 y.o. CF who was seen and evaluated during the FU OV after the recent BW test. Pt also mentioned that her spouse was admitted to the hospital with the heart attack and these event triggered a lot of \"stress and anxiety\" in her life. Pt is asking for non addictive mediation which he can use on as needed base to alleviate the sxs. Pt also did some yard work last weekend and noticed the area of the skin rash on the left side of the chest. Pt was not sure if she was exposed to the poison ivy plant or not.During the clinical encounter pt denies fever, chills, no SOB, no chest pain/tightness, no abdominal pain, no N/V/D reported, no change of urination reported. Pt denies any other health concerns during this visit.  Sohx: reviewed  List of the medications and allergies: reviewed  PMHx and Fhx: reviewed    Review of Systems  The systems have been reviewed as follows:   Constitutional: no fever, no chills  Eyes: no eyesight problems, no eye redness, no eye pain, no blurred vision  ENT: no ear pain or sore throat, no nasal discharge or congestion, no sinus pressure, no hearing loss  Cardiovascular: no chest pain or tightness, no SOB, the heart rate was not fast or slow  Respiratory: no cough, no dyspnea with exertion or with rest, no wheezing  Gastrointestinal: no abdominal pain, no constipation or diarrhea, no heartburn, no nausea or vomiting  Genitourinary: no urine frequency, no dysuria, no urinary urgency, no urinary incontinence or retention  Musculoskeletal: no back pain, no arthralgia, no joint swelling or stiffness, no muscle weakness  Integumentary: as mentioned at HPI  Neurological: no headaches, no dizziness or fainting, no limb weakness  Psychiatric: no mood changes, no depression, no SI/HI, but as mentioned at HPI  Endocrine: no weight change, no temperature intolerance, no   change of " "appetite  Hematologic/Lymphatic: no easy bruising or bleeding  Objective   /74 (BP Location: Left arm, Patient Position: Sitting)   Pulse 65   Temp 36.5 °C (97.7 °F)   Ht 1.6 m (5' 3\")   Wt 76.8 kg (169 lb 6.4 oz)   BMI 30.01 kg/m²     Physical Exam  Constitutional: well hydrated, well nourished, no acute distress. Vital signs reviewed  Head and face: normocephalic, atraumatic  Eyes: no erythema, edema or visible abnormalities of conjunctiva or lids. Pupils are equal, round and reactive to light. Extraocular movement normal  ENT: external ears without deformities  Neck: full ROM, no adenopathy, neck was supple, thyroid gland not enlarged  Pulmonary: normal respiratory rate and effort. Lungs are clear to auscultation, no rales,no rhonchi or wheezing  Cardiovascular: RRR, normal S1 and S2, no murmur, no gallop, posterior tib. pulse normal 2+ bilaterally  Abdomen: soft, non tender on palpation, not distended, normal BS in all 4 quadrants  Musculoskeletal: no joint swelling, normal movements of all 4 extremities. Gait and station: normal, Digits and nails: normal without clubbing  Skin: the area of the mild erythema oval shape about 2x5 cm on size, no pain/fluctuation on palpation  Neurologic: Cranial nerves: CN II: visual acuity intact. Source: acuity reported by patient. Pupils reactive to light with normal accommodation. Right and left pupil normal CN III, IV and VI: the EO movements were intact. CN VIII: no hearing loss. Deep tendon reflexes: were 2+ and symmetric:  R and L patella.  Sensory exam: normal to light touch  Psychiatric: Mood and affect: normal, Alert and Oriented x 3  Lymphatic: no cervical lymphadenopathy    Assessment/Plan   Encounter to discuss the recent test results  I revived, shared and discuss the recent test results with the pt in details. Pt verbalized understanding  Pre diabetes: the lifestyle modifications were dicussed and printed education material was provided. Will recheck " HGba1C  Pt also requested the copy of the recent BW test results which also provided as per the pts request  Subclinical hypothyroidism: well controlled. No SE of the medication. Will continue levothyroxine 12,5 mg po daily, refilled. Will check TSH/T4 (future labs)  Skin rash: Hx and PE as mentioned  Will start on betamethasone cream 0.1% BIDx 7 days. Pt was educated about the sxs of the skin infection and was advised to contact to PCP if she will have any  Stress at home: will start on hydroxyzine 25 mg PO BID PRN for the sxs relief x 30 days  Obesity WHO class I: continue with the lifestyle modifications  Will also check CBC, CMP, lipid panel  I discussed every sxs with the pt in detail. Pt verbalized understanding of the health  condition and agreed with the clinical approach as discussed as mentioned above  Please FU with PCP as planned or sooner if needed.

## 2024-07-17 DIAGNOSIS — F43.9 STRESS AT HOME: ICD-10-CM

## 2024-07-17 RX ORDER — HYDROXYZINE HYDROCHLORIDE 25 MG/1
25 TABLET, FILM COATED ORAL 2 TIMES DAILY PRN
Qty: 60 TABLET | Refills: 1 | Status: SHIPPED | OUTPATIENT
Start: 2024-07-17 | End: 2024-09-15

## 2024-08-14 DIAGNOSIS — F43.9 STRESS AT HOME: ICD-10-CM

## 2024-08-15 RX ORDER — HYDROXYZINE HYDROCHLORIDE 25 MG/1
25 TABLET, FILM COATED ORAL 2 TIMES DAILY PRN
Qty: 60 TABLET | Refills: 0 | Status: SHIPPED | OUTPATIENT
Start: 2024-08-15 | End: 2024-10-14

## 2024-08-16 DIAGNOSIS — F43.9 STRESS AT HOME: ICD-10-CM

## 2024-08-20 RX ORDER — HYDROXYZINE HYDROCHLORIDE 25 MG/1
25 TABLET, FILM COATED ORAL 2 TIMES DAILY PRN
Refills: 0 | OUTPATIENT
Start: 2024-08-20 | End: 2024-10-19

## 2024-09-14 DIAGNOSIS — K22.70 BARRETT'S ESOPHAGUS WITHOUT DYSPLASIA: ICD-10-CM

## 2024-09-14 DIAGNOSIS — K21.00 GASTROESOPHAGEAL REFLUX DISEASE WITH ESOPHAGITIS WITHOUT HEMORRHAGE: ICD-10-CM

## 2024-09-17 RX ORDER — PANTOPRAZOLE SODIUM 40 MG/1
40 TABLET, DELAYED RELEASE ORAL 2 TIMES DAILY
Qty: 180 TABLET | Refills: 1 | Status: SHIPPED | OUTPATIENT
Start: 2024-09-17

## 2024-09-25 ENCOUNTER — OFFICE VISIT (OUTPATIENT)
Dept: PRIMARY CARE | Facility: CLINIC | Age: 74
End: 2024-09-25
Payer: MEDICARE

## 2024-09-25 VITALS
WEIGHT: 169 LBS | HEIGHT: 63 IN | BODY MASS INDEX: 29.95 KG/M2 | SYSTOLIC BLOOD PRESSURE: 116 MMHG | DIASTOLIC BLOOD PRESSURE: 60 MMHG | HEART RATE: 58 BPM

## 2024-09-25 DIAGNOSIS — R21 SKIN RASH: Primary | ICD-10-CM

## 2024-09-25 PROCEDURE — 99213 OFFICE O/P EST LOW 20 MIN: CPT | Performed by: FAMILY MEDICINE

## 2024-09-25 PROCEDURE — 1159F MED LIST DOCD IN RCRD: CPT | Performed by: FAMILY MEDICINE

## 2024-09-25 PROCEDURE — 1036F TOBACCO NON-USER: CPT | Performed by: FAMILY MEDICINE

## 2024-09-25 PROCEDURE — 1124F ACP DISCUSS-NO DSCNMKR DOCD: CPT | Performed by: FAMILY MEDICINE

## 2024-09-25 ASSESSMENT — ENCOUNTER SYMPTOMS
FATIGUE: 0
ACTIVITY CHANGE: 0
SHORTNESS OF BREATH: 0
HEADACHES: 0
DIZZINESS: 0
FEVER: 0

## 2024-09-25 NOTE — PROGRESS NOTES
"Subjective   Patient ID: Lauren Patel is a 74 y.o. female who presents for Sick Visit (Rash on neckline and right shoulder. It is red and itchy. Has had this for 2-3 months has seen a  no relief. ).    Rash   - present on chest and neck   - has tried multiple interventions including changing laundry soaps and body wash   - was given hydroxyzine but patient did not take it due to concerns for side effects   - reports rash is very itchy, but comes and goes   - also tried topical steroid cream without significant improvement in symptoms   - reports she has been dealing with significant stressors that may be influencing her symptoms                Review of Systems   Constitutional:  Negative for activity change, fatigue and fever.   Respiratory:  Negative for shortness of breath.    Cardiovascular:  Negative for chest pain.   Neurological:  Negative for dizziness and headaches.       Objective   /60   Pulse 58   Ht 1.6 m (5' 3\")   Wt 76.7 kg (169 lb)   BMI 29.94 kg/m²     Physical Exam  Constitutional:       Appearance: Normal appearance.   Neurological:      Mental Status: She is alert.         Assessment/Plan   Problem List Items Addressed This Visit             ICD-10-CM    Skin rash - Primary R21     stable  - likely stress related   - trial topical diphenhydramine   - if not improving f/u with dermatology          Relevant Medications    diphenhydramine-zinc acetate (BENADryl) cream    Other Relevant Orders    Referral to Dermatology          "

## 2024-09-25 NOTE — ASSESSMENT & PLAN NOTE
stable  - likely stress related   - trial topical diphenhydramine   - if not improving f/u with dermatology

## 2024-10-22 ENCOUNTER — OFFICE VISIT (OUTPATIENT)
Dept: PRIMARY CARE | Facility: CLINIC | Age: 74
End: 2024-10-22
Payer: MEDICARE

## 2024-10-22 ENCOUNTER — APPOINTMENT (OUTPATIENT)
Dept: PRIMARY CARE | Facility: CLINIC | Age: 74
End: 2024-10-22
Payer: MEDICARE

## 2024-10-22 VITALS
HEIGHT: 62 IN | WEIGHT: 169 LBS | BODY MASS INDEX: 31.1 KG/M2 | SYSTOLIC BLOOD PRESSURE: 113 MMHG | DIASTOLIC BLOOD PRESSURE: 73 MMHG | HEART RATE: 58 BPM

## 2024-10-22 DIAGNOSIS — K22.70 BARRETT'S ESOPHAGUS WITHOUT DYSPLASIA: ICD-10-CM

## 2024-10-22 DIAGNOSIS — K21.00 GASTROESOPHAGEAL REFLUX DISEASE WITH ESOPHAGITIS WITHOUT HEMORRHAGE: ICD-10-CM

## 2024-10-22 DIAGNOSIS — Z23 NEED FOR INFLUENZA VACCINATION: ICD-10-CM

## 2024-10-22 DIAGNOSIS — Z12.31 ENCOUNTER FOR SCREENING MAMMOGRAM FOR MALIGNANT NEOPLASM OF BREAST: ICD-10-CM

## 2024-10-22 DIAGNOSIS — R79.89 ABNORMAL TSH: ICD-10-CM

## 2024-10-22 DIAGNOSIS — E03.8 SUBCLINICAL HYPOTHYROIDISM: ICD-10-CM

## 2024-10-22 DIAGNOSIS — Z00.00 ROUTINE GENERAL MEDICAL EXAMINATION AT HEALTH CARE FACILITY: Primary | ICD-10-CM

## 2024-10-22 DIAGNOSIS — R21 SKIN RASH: ICD-10-CM

## 2024-10-22 PROBLEM — R42 DIZZINESS: Status: ACTIVE | Noted: 2023-09-27

## 2024-10-22 PROCEDURE — 1159F MED LIST DOCD IN RCRD: CPT | Performed by: INTERNAL MEDICINE

## 2024-10-22 PROCEDURE — 1158F ADVNC CARE PLAN TLK DOCD: CPT | Performed by: INTERNAL MEDICINE

## 2024-10-22 PROCEDURE — G0008 ADMIN INFLUENZA VIRUS VAC: HCPCS | Performed by: INTERNAL MEDICINE

## 2024-10-22 PROCEDURE — 1170F FXNL STATUS ASSESSED: CPT | Performed by: INTERNAL MEDICINE

## 2024-10-22 PROCEDURE — G0439 PPPS, SUBSEQ VISIT: HCPCS | Performed by: INTERNAL MEDICINE

## 2024-10-22 PROCEDURE — 1036F TOBACCO NON-USER: CPT | Performed by: INTERNAL MEDICINE

## 2024-10-22 PROCEDURE — 1123F ACP DISCUSS/DSCN MKR DOCD: CPT | Performed by: INTERNAL MEDICINE

## 2024-10-22 PROCEDURE — 90662 IIV NO PRSV INCREASED AG IM: CPT | Performed by: INTERNAL MEDICINE

## 2024-10-22 RX ORDER — METHYLPREDNISOLONE 4 MG/1
TABLET ORAL
Qty: 21 TABLET | Refills: 0 | Status: SHIPPED | OUTPATIENT
Start: 2024-10-22 | End: 2024-10-29

## 2024-10-22 RX ORDER — LEVOTHYROXINE SODIUM 25 UG/1
12.5 TABLET ORAL
Qty: 45 TABLET | Refills: 1 | Status: SHIPPED | OUTPATIENT
Start: 2024-10-22 | End: 2025-04-20

## 2024-10-22 RX ORDER — PANTOPRAZOLE SODIUM 40 MG/1
40 TABLET, DELAYED RELEASE ORAL 2 TIMES DAILY
Qty: 180 TABLET | Refills: 1 | Status: SHIPPED | OUTPATIENT
Start: 2024-10-22

## 2024-10-22 ASSESSMENT — ACTIVITIES OF DAILY LIVING (ADL)
TAKING_MEDICATION: INDEPENDENT
BATHING: INDEPENDENT
DOING_HOUSEWORK: INDEPENDENT
MANAGING_FINANCES: INDEPENDENT
GROCERY_SHOPPING: INDEPENDENT
DRESSING: INDEPENDENT

## 2024-10-22 ASSESSMENT — PATIENT HEALTH QUESTIONNAIRE - PHQ9
2. FEELING DOWN, DEPRESSED OR HOPELESS: NOT AT ALL
1. LITTLE INTEREST OR PLEASURE IN DOING THINGS: NOT AT ALL
SUM OF ALL RESPONSES TO PHQ9 QUESTIONS 1 AND 2: 0

## 2024-10-22 NOTE — ASSESSMENT & PLAN NOTE
Orders:    Referral to Dermatology    methylPREDNISolone (Medrol Dospak) 4 mg tablets; Take as directed on package.

## 2024-10-22 NOTE — ASSESSMENT & PLAN NOTE
Orders:    levothyroxine (Synthroid, Levoxyl) 25 mcg tablet; Take 0.5 tablets (12.5 mcg) by mouth once daily in the morning. Take before meals.

## 2024-10-22 NOTE — ASSESSMENT & PLAN NOTE
Orders:    pantoprazole (ProtoNix) 40 mg EC tablet; Take 1 tablet (40 mg) by mouth 2 times a day. Do not crush, chew, or split.

## 2024-10-22 NOTE — PROGRESS NOTES
"Subjective   Reason for Visit: Lauren Patel is an 74 y.o. female here for a Medicare Wellness visit.     Past Medical, Surgical, and Family History reviewed and updated in chart.    Reviewed all medications by prescribing practitioner or clinical pharmacist (such as prescriptions, OTCs, herbal therapies and supplements) and documented in the medical record.    HPI  Came for wellness visit  Discussed about the mammogram she wanted order order has been done  She does not have a living will we discussed about that to  She is otherwise doing well except there is a skin rash in the front of the chest which has not improved in spite of using Benadryl cream or as advised by the physician whom I saw before    Patient Care Team:  Connie Capone MD as PCP - General (Internal Medicine)  Connie Capone MD as PCP - [employer] PCP     Review of Systems as above otherwise negative  Uses hearing aid and has hearing deficiency  Past medical history reviewed  Social and family history reviewed  Allergies and medications reviewed  Recent labs reviewed  Vital signs reviewed    Objective   Vitals:  /73 (BP Location: Right arm, Patient Position: Sitting)   Pulse 58   Ht 1.575 m (5' 2\")   Wt 76.7 kg (169 lb)   BMI 30.91 kg/m²       Physical Exam  Skin rash in the front of the chest consistent with allergic dermatitis  Cardiovascular chest abdominal neurological examination normal  Assessment & Plan  Need for influenza vaccination    Orders:    Flu vaccine, trivalent, preservative free, HIGH-DOSE, age 65y+ (Fluzone)    Abnormal TSH    Orders:    levothyroxine (Synthroid, Levoxyl) 25 mcg tablet; Take 0.5 tablets (12.5 mcg) by mouth once daily in the morning. Take before meals.    Subclinical hypothyroidism    Orders:    levothyroxine (Synthroid, Levoxyl) 25 mcg tablet; Take 0.5 tablets (12.5 mcg) by mouth once daily in the morning. Take before meals.    Welch's esophagus without " dysplasia    Orders:    pantoprazole (ProtoNix) 40 mg EC tablet; Take 1 tablet (40 mg) by mouth 2 times a day. Do not crush, chew, or split.    Gastroesophageal reflux disease with esophagitis without hemorrhage    Orders:    pantoprazole (ProtoNix) 40 mg EC tablet; Take 1 tablet (40 mg) by mouth 2 times a day. Do not crush, chew, or split.    Routine general medical examination at health care facility    Orders:    1 Year Follow Up In Primary Care - Wellness Exam; Future    Encounter for screening mammogram for malignant neoplasm of breast    Orders:    BI mammo bilateral screening tomosynthesis; Future    Skin rash    Orders:    Referral to Dermatology    methylPREDNISolone (Medrol Dospak) 4 mg tablets; Take as directed on package.

## 2024-11-25 ENCOUNTER — HOSPITAL ENCOUNTER (OUTPATIENT)
Dept: RADIOLOGY | Facility: HOSPITAL | Age: 74
Discharge: HOME | End: 2024-11-25
Payer: MEDICARE

## 2024-11-25 DIAGNOSIS — Z12.31 ENCOUNTER FOR SCREENING MAMMOGRAM FOR MALIGNANT NEOPLASM OF BREAST: ICD-10-CM

## 2024-11-25 PROCEDURE — 77063 BREAST TOMOSYNTHESIS BI: CPT | Performed by: RADIOLOGY

## 2024-11-25 PROCEDURE — 77067 SCR MAMMO BI INCL CAD: CPT

## 2024-11-25 PROCEDURE — 77067 SCR MAMMO BI INCL CAD: CPT | Performed by: RADIOLOGY

## 2024-12-02 ENCOUNTER — TELEPHONE (OUTPATIENT)
Dept: PRIMARY CARE | Facility: CLINIC | Age: 74
End: 2024-12-02
Payer: MEDICARE

## 2024-12-02 NOTE — TELEPHONE ENCOUNTER
----- Message from Dot Shankar sent at 12/2/2024 12:32 PM EST -----  Negative mammogram.  Repeat in 1 year  ----- Message -----  From: Interface, Radiology Results In  Sent: 12/1/2024   6:56 AM EST  To: Linwood White MD

## 2024-12-04 ENCOUNTER — LAB (OUTPATIENT)
Dept: LAB | Facility: LAB | Age: 74
End: 2024-12-04
Payer: MEDICARE

## 2024-12-04 DIAGNOSIS — E66.09 CLASS 1 OBESITY DUE TO EXCESS CALORIES WITH SERIOUS COMORBIDITY AND BODY MASS INDEX (BMI) OF 34.0 TO 34.9 IN ADULT: ICD-10-CM

## 2024-12-04 DIAGNOSIS — R73.03 PRE-DIABETES: ICD-10-CM

## 2024-12-04 DIAGNOSIS — E66.811 CLASS 1 OBESITY DUE TO EXCESS CALORIES WITH SERIOUS COMORBIDITY AND BODY MASS INDEX (BMI) OF 34.0 TO 34.9 IN ADULT: ICD-10-CM

## 2024-12-04 DIAGNOSIS — E03.8 SUBCLINICAL HYPOTHYROIDISM: ICD-10-CM

## 2024-12-04 LAB
ALBUMIN SERPL BCP-MCNC: 4.2 G/DL (ref 3.4–5)
ALP SERPL-CCNC: 70 U/L (ref 33–136)
ALT SERPL W P-5'-P-CCNC: 21 U/L (ref 7–45)
ANION GAP SERPL CALC-SCNC: 10 MMOL/L (ref 10–20)
AST SERPL W P-5'-P-CCNC: 22 U/L (ref 9–39)
BASOPHILS # BLD AUTO: 0.05 X10*3/UL (ref 0–0.1)
BASOPHILS NFR BLD AUTO: 1.1 %
BILIRUB SERPL-MCNC: 0.8 MG/DL (ref 0–1.2)
BUN SERPL-MCNC: 19 MG/DL (ref 6–23)
CALCIUM SERPL-MCNC: 9.1 MG/DL (ref 8.6–10.3)
CHLORIDE SERPL-SCNC: 105 MMOL/L (ref 98–107)
CHOLEST SERPL-MCNC: 178 MG/DL (ref 0–199)
CHOLESTEROL/HDL RATIO: 2.6
CO2 SERPL-SCNC: 30 MMOL/L (ref 21–32)
CREAT SERPL-MCNC: 1.07 MG/DL (ref 0.5–1.05)
EGFRCR SERPLBLD CKD-EPI 2021: 54 ML/MIN/1.73M*2
EOSINOPHIL # BLD AUTO: 0.45 X10*3/UL (ref 0–0.4)
EOSINOPHIL NFR BLD AUTO: 10 %
ERYTHROCYTE [DISTWIDTH] IN BLOOD BY AUTOMATED COUNT: 13.1 % (ref 11.5–14.5)
EST. AVERAGE GLUCOSE BLD GHB EST-MCNC: 117 MG/DL
GLUCOSE SERPL-MCNC: 85 MG/DL (ref 74–99)
HBA1C MFR BLD: 5.7 %
HCT VFR BLD AUTO: 44 % (ref 36–46)
HDLC SERPL-MCNC: 69.7 MG/DL
HGB BLD-MCNC: 13.9 G/DL (ref 12–16)
IMM GRANULOCYTES # BLD AUTO: 0.01 X10*3/UL (ref 0–0.5)
IMM GRANULOCYTES NFR BLD AUTO: 0.2 % (ref 0–0.9)
LDLC SERPL CALC-MCNC: 85 MG/DL
LYMPHOCYTES # BLD AUTO: 1.1 X10*3/UL (ref 0.8–3)
LYMPHOCYTES NFR BLD AUTO: 24.4 %
MCH RBC QN AUTO: 30.1 PG (ref 26–34)
MCHC RBC AUTO-ENTMCNC: 31.6 G/DL (ref 32–36)
MCV RBC AUTO: 95 FL (ref 80–100)
MONOCYTES # BLD AUTO: 0.44 X10*3/UL (ref 0.05–0.8)
MONOCYTES NFR BLD AUTO: 9.8 %
NEUTROPHILS # BLD AUTO: 2.46 X10*3/UL (ref 1.6–5.5)
NEUTROPHILS NFR BLD AUTO: 54.5 %
NON HDL CHOLESTEROL: 108 MG/DL (ref 0–149)
NRBC BLD-RTO: 0 /100 WBCS (ref 0–0)
PLATELET # BLD AUTO: 229 X10*3/UL (ref 150–450)
POTASSIUM SERPL-SCNC: 4.3 MMOL/L (ref 3.5–5.3)
PROT SERPL-MCNC: 6.6 G/DL (ref 6.4–8.2)
RBC # BLD AUTO: 4.62 X10*6/UL (ref 4–5.2)
SODIUM SERPL-SCNC: 141 MMOL/L (ref 136–145)
T4 FREE SERPL-MCNC: 0.83 NG/DL (ref 0.61–1.12)
TRIGL SERPL-MCNC: 119 MG/DL (ref 0–149)
TSH SERPL-ACNC: 6.86 MIU/L (ref 0.44–3.98)
VLDL: 24 MG/DL (ref 0–40)
WBC # BLD AUTO: 4.5 X10*3/UL (ref 4.4–11.3)

## 2024-12-04 PROCEDURE — 36415 COLL VENOUS BLD VENIPUNCTURE: CPT

## 2024-12-04 PROCEDURE — 84439 ASSAY OF FREE THYROXINE: CPT

## 2024-12-04 PROCEDURE — 85025 COMPLETE CBC W/AUTO DIFF WBC: CPT

## 2024-12-04 PROCEDURE — 83036 HEMOGLOBIN GLYCOSYLATED A1C: CPT

## 2024-12-04 PROCEDURE — 80053 COMPREHEN METABOLIC PANEL: CPT

## 2024-12-04 PROCEDURE — 80061 LIPID PANEL: CPT

## 2024-12-04 PROCEDURE — 84443 ASSAY THYROID STIM HORMONE: CPT

## 2024-12-05 ENCOUNTER — TELEPHONE (OUTPATIENT)
Dept: PRIMARY CARE | Facility: CLINIC | Age: 74
End: 2024-12-05
Payer: MEDICARE

## 2024-12-05 NOTE — TELEPHONE ENCOUNTER
----- Message from Lisette Vieyra sent at 12/5/2024 10:28 AM EST -----  Please advise patient that blood sugar remains mildly elevated.  Continue to work on healthy diet and exercise.  Kidney function is mildly decreased.  Thyroid is borderline low.  Other blood work is okay.  Would recommend she follow-up to discuss.

## 2024-12-09 ENCOUNTER — APPOINTMENT (OUTPATIENT)
Dept: PRIMARY CARE | Facility: CLINIC | Age: 74
End: 2024-12-09
Payer: MEDICARE

## 2024-12-09 VITALS
TEMPERATURE: 97.7 F | BODY MASS INDEX: 31.69 KG/M2 | HEART RATE: 66 BPM | HEIGHT: 62 IN | WEIGHT: 172.2 LBS | DIASTOLIC BLOOD PRESSURE: 78 MMHG | SYSTOLIC BLOOD PRESSURE: 126 MMHG

## 2024-12-09 DIAGNOSIS — R73.03 PRE-DIABETES: ICD-10-CM

## 2024-12-09 DIAGNOSIS — N18.31 STAGE 3A CHRONIC KIDNEY DISEASE (MULTI): ICD-10-CM

## 2024-12-09 DIAGNOSIS — E03.8 SUBCLINICAL HYPOTHYROIDISM: Primary | ICD-10-CM

## 2024-12-09 DIAGNOSIS — E66.09 CLASS 1 OBESITY DUE TO EXCESS CALORIES WITHOUT SERIOUS COMORBIDITY WITH BODY MASS INDEX (BMI) OF 31.0 TO 31.9 IN ADULT: ICD-10-CM

## 2024-12-09 DIAGNOSIS — R35.0 URINATION FREQUENCY: ICD-10-CM

## 2024-12-09 DIAGNOSIS — E66.811 CLASS 1 OBESITY DUE TO EXCESS CALORIES WITHOUT SERIOUS COMORBIDITY WITH BODY MASS INDEX (BMI) OF 31.0 TO 31.9 IN ADULT: ICD-10-CM

## 2024-12-09 PROCEDURE — 1036F TOBACCO NON-USER: CPT | Performed by: FAMILY MEDICINE

## 2024-12-09 PROCEDURE — 99214 OFFICE O/P EST MOD 30 MIN: CPT | Performed by: FAMILY MEDICINE

## 2024-12-09 PROCEDURE — 1159F MED LIST DOCD IN RCRD: CPT | Performed by: FAMILY MEDICINE

## 2024-12-09 PROCEDURE — 1123F ACP DISCUSS/DSCN MKR DOCD: CPT | Performed by: FAMILY MEDICINE

## 2024-12-09 PROCEDURE — 1160F RVW MEDS BY RX/DR IN RCRD: CPT | Performed by: FAMILY MEDICINE

## 2024-12-09 RX ORDER — LEVOTHYROXINE SODIUM 50 UG/1
50 TABLET ORAL DAILY
Qty: 90 TABLET | Refills: 0 | Status: SHIPPED | OUTPATIENT
Start: 2024-12-09 | End: 2025-03-09

## 2024-12-09 ASSESSMENT — PATIENT HEALTH QUESTIONNAIRE - PHQ9
SUM OF ALL RESPONSES TO PHQ9 QUESTIONS 1 AND 2: 0
1. LITTLE INTEREST OR PLEASURE IN DOING THINGS: NOT AT ALL
2. FEELING DOWN, DEPRESSED OR HOPELESS: NOT AT ALL

## 2024-12-09 NOTE — ASSESSMENT & PLAN NOTE
Hemoglobin A1c mildly elevated.  Continue to work on healthy diet and exercise.  Recheck CMP and hemoglobin A1c in 3 to 4 months.

## 2024-12-09 NOTE — PROGRESS NOTES
Subjective   Patient ID: Lauren Patel is a 74 y.o. female who presents for Follow-up (Blood work results).  Patient presents today for follow-up on her blood work results.  She reports that overall she has been doing okay.  We reviewed that her thyroid blood work was borderline.  She does report feeling tired and is interested in increasing her Synthroid.  We also reviewed that her kidney function was mildly decreased.  She states she does not think she drinks enough water.  She states that she sometimes has urinary frequency.  She is also aware that she has prediabetes.  We reviewed this is about the same as previously.  She states she has been trying to work on eating healthy and exercising.  She denies any chest pain or shortness of breath or abdominal pain.  HPI  Social History     Socioeconomic History    Marital status:      Spouse name: Not on file    Number of children: Not on file    Years of education: Not on file    Highest education level: Not on file   Occupational History    Not on file   Tobacco Use    Smoking status: Never    Smokeless tobacco: Never   Substance and Sexual Activity    Alcohol use: Never    Drug use: Never    Sexual activity: Not on file   Other Topics Concern    Not on file   Social History Narrative    Not on file     Social Drivers of Health     Financial Resource Strain: Not on file   Food Insecurity: Not on file   Transportation Needs: Not on file   Physical Activity: Not on file   Stress: Not on file   Social Connections: Not on file   Intimate Partner Violence: Not on file   Housing Stability: Not on file     Current Outpatient Medications   Medication Sig Dispense Refill    acetaminophen (Tylenol 8 HOUR) 650 mg ER tablet Take 1 tablet (650 mg) by mouth every 8 hours if needed for mild pain (1 - 3). Do not crush, chew, or split.      famotidine (Pepcid) 20 mg tablet Take by mouth.      melatonin 5 mg tablet,disintegrating Take by mouth.      multivitamin with minerals  "iron-free (multivit-iron-minerals-folic acid) Take by mouth.      levothyroxine (Synthroid, Levoxyl) 50 mcg tablet Take 1 tablet (50 mcg) by mouth early in the morning.. Take on an empty stomach at the same time each day, either 30 to 60 minutes prior to breakfast 90 tablet 0     No current facility-administered medications for this visit.     Family History   Problem Relation Name Age of Onset    Heart attack Father       Review of Systems  Immunization History   Administered Date(s) Administered    DT (pediatric) 04/30/2003    Flu vaccine, quadrivalent, high-dose, preservative free, age 65y+ (FLUZONE) 10/09/2020, 10/11/2021, 11/06/2023    Flu vaccine, trivalent, preservative free, HIGH-DOSE, age 65y+ (Fluzone) 11/28/2018, 12/19/2019, 10/22/2024    Influenza, Unspecified 12/02/2013    Influenza, seasonal, injectable 10/28/2014, 02/19/2017    Moderna COVID-19 vaccine, 12 years and older (50mcg/0.5mL)(Spikevax) 10/20/2023    Moderna COVID-19 vaccine, bivalent, blue cap/gray label *Check age/dose* 10/29/2022    Moderna SARS-CoV-2 Vaccination 02/10/2021, 03/10/2021, 05/20/2022    PPD Test 02/12/2008    Pneumococcal conjugate vaccine, 13-valent (PREVNAR 13) 12/03/2019    Pneumococcal polysaccharide vaccine, 23-valent, age 2 years and older (PNEUMOVAX 23) 12/02/2013    Td vaccine, age 7 years and older (TDVAX) 04/30/2003    Tdap vaccine, age 7 year and older (BOOSTRIX, ADACEL) 10/28/2014    Zoster vaccine, recombinant, adult (SHINGRIX) 04/05/2024       Review of Systems negative except as noted in HPI and Chief complaint.     Objective                 /78 (BP Location: Left arm, Patient Position: Sitting)   Pulse 66   Temp 36.5 °C (97.7 °F)   Ht 1.575 m (5' 2\")   Wt 78.1 kg (172 lb 3.2 oz)   BMI 31.50 kg/m²    Physical Exam  Vitals reviewed.   HENT:      Head: Normocephalic and atraumatic.      Right Ear: Tympanic membrane normal.      Left Ear: Tympanic membrane normal.      Nose: Nose normal.      " "Mouth/Throat:      Pharynx: Oropharynx is clear.   Eyes:      Extraocular Movements: Extraocular movements intact.      Conjunctiva/sclera: Conjunctivae normal.      Pupils: Pupils are equal, round, and reactive to light.   Cardiovascular:      Rate and Rhythm: Normal rate and regular rhythm.      Pulses: Normal pulses.   Pulmonary:      Effort: Pulmonary effort is normal.      Breath sounds: Normal breath sounds.   Abdominal:      General: There is no distension.      Palpations: Abdomen is soft.      Tenderness: There is no abdominal tenderness.   Musculoskeletal:         General: Normal range of motion.      Cervical back: Normal range of motion and neck supple.      Right lower leg: No edema.      Left lower leg: No edema.   Lymphadenopathy:      Cervical: No cervical adenopathy.   Neurological:      General: No focal deficit present.      Mental Status: She is alert.       No results found for this or any previous visit (from the past 96 hours).  Lab Results   Component Value Date    WBC 4.5 12/04/2024    HGB 13.9 12/04/2024    HCT 44.0 12/04/2024    MCV 95 12/04/2024     12/04/2024     Lab Results   Component Value Date    GLUCOSE 85 12/04/2024    CALCIUM 9.1 12/04/2024     12/04/2024    K 4.3 12/04/2024    CO2 30 12/04/2024     12/04/2024    BUN 19 12/04/2024    CREATININE 1.07 (H) 12/04/2024     Lab Results   Component Value Date    CHOL 178 12/04/2024    CHOL 177 11/07/2023    CHOL 166 10/11/2021     Lab Results   Component Value Date    HDL 69.7 12/04/2024    HDL 61.2 11/07/2023    HDL 64.0 10/11/2021     Lab Results   Component Value Date    LDLCALC 85 12/04/2024    LDLCALC 94 11/07/2023     Lab Results   Component Value Date    TRIG 119 12/04/2024    TRIG 107 11/07/2023    TRIG 76 10/11/2021     No components found for: \"CHOLHDL\"   Lab Results   Component Value Date    TSH 6.86 (H) 12/04/2024      Lab Results   Component Value Date    HGBA1C 5.7 (H) 12/04/2024      No results found " "for: \"ALBUMINUR\"   Assessment/Plan   Problem List Items Addressed This Visit       Stage 3a chronic kidney disease (Multi)     Patient increase fluid intake.  Avoid NSAIDs.  Check urinalysis and follow-up in a few months to reevaluate.         Relevant Orders    Urinalysis with Reflex Culture and Microscopic    Comprehensive Metabolic Panel    CBC and Auto Differential    Subclinical hypothyroidism - Primary     Plan to increase levothyroxine to 50 mcg daily.  Recheck TSH along with TPO.         Relevant Medications    levothyroxine (Synthroid, Levoxyl) 50 mcg tablet    Other Relevant Orders    TSH with reflex to Free T4 if abnormal    Thyroid Peroxidase (TPO) Antibody    Pre-diabetes     Hemoglobin A1c mildly elevated.  Continue to work on healthy diet and exercise.  Recheck CMP and hemoglobin A1c in 3 to 4 months.         Class 1 obesity due to excess calories without serious comorbidity with body mass index (BMI) of 31.0 to 31.9 in adult    Urination frequency     Will check urinalysis to rule out infection.         Relevant Orders    Urinalysis with Reflex Culture and Microscopic            "

## 2024-12-09 NOTE — ASSESSMENT & PLAN NOTE
Patient increase fluid intake.  Avoid NSAIDs.  Check urinalysis and follow-up in a few months to reevaluate.

## 2024-12-31 DIAGNOSIS — E03.8 SUBCLINICAL HYPOTHYROIDISM: Primary | ICD-10-CM

## 2024-12-31 RX ORDER — LEVOTHYROXINE SODIUM 25 UG/1
25 TABLET ORAL DAILY
Qty: 90 TABLET | Refills: 0 | Status: SHIPPED | OUTPATIENT
Start: 2024-12-31 | End: 2025-03-31

## 2025-01-28 ENCOUNTER — TELEPHONE (OUTPATIENT)
Dept: PRIMARY CARE | Facility: CLINIC | Age: 75
End: 2025-01-28
Payer: MEDICARE

## 2025-01-28 NOTE — TELEPHONE ENCOUNTER
----- Message from Lisette Vieyra sent at 1/28/2025  3:47 PM EST -----  Please advise patient that thyroid is in the normal range.  Other blood work is okay.  Still waiting on urine culture.

## 2025-01-29 LAB
ALBUMIN SERPL-MCNC: 4.4 G/DL (ref 3.6–5.1)
ALP SERPL-CCNC: 61 U/L (ref 37–153)
ALT SERPL-CCNC: 19 U/L (ref 6–29)
ANION GAP SERPL CALCULATED.4IONS-SCNC: 8 MMOL/L (CALC) (ref 7–17)
APPEARANCE UR: CLEAR
AST SERPL-CCNC: 21 U/L (ref 10–35)
BACTERIA #/AREA URNS HPF: ABNORMAL /HPF
BACTERIA UR CULT: ABNORMAL
BACTERIA UR CULT: ABNORMAL
BASOPHILS # BLD AUTO: 40 CELLS/UL (ref 0–200)
BASOPHILS NFR BLD AUTO: 1 %
BILIRUB SERPL-MCNC: 1.1 MG/DL (ref 0.2–1.2)
BILIRUB UR QL STRIP: NEGATIVE
BUN SERPL-MCNC: 16 MG/DL (ref 7–25)
CALCIUM SERPL-MCNC: 8.9 MG/DL (ref 8.6–10.4)
CHLORIDE SERPL-SCNC: 105 MMOL/L (ref 98–110)
CO2 SERPL-SCNC: 28 MMOL/L (ref 20–32)
COLOR UR: YELLOW
CREAT SERPL-MCNC: 0.93 MG/DL (ref 0.6–1)
EGFRCR SERPLBLD CKD-EPI 2021: 64 ML/MIN/1.73M2
EOSINOPHIL # BLD AUTO: 324 CELLS/UL (ref 15–500)
EOSINOPHIL NFR BLD AUTO: 8.1 %
ERYTHROCYTE [DISTWIDTH] IN BLOOD BY AUTOMATED COUNT: 12.4 % (ref 11–15)
GLUCOSE SERPL-MCNC: 96 MG/DL (ref 65–99)
GLUCOSE UR QL STRIP: NEGATIVE
HCT VFR BLD AUTO: 46.1 % (ref 35–45)
HGB BLD-MCNC: 14.4 G/DL (ref 11.7–15.5)
HGB UR QL STRIP: NEGATIVE
HYALINE CASTS #/AREA URNS LPF: ABNORMAL /LPF
KETONES UR QL STRIP: NEGATIVE
LEUKOCYTE ESTERASE UR QL STRIP: ABNORMAL
LYMPHOCYTES # BLD AUTO: 980 CELLS/UL (ref 850–3900)
LYMPHOCYTES NFR BLD AUTO: 24.5 %
MCH RBC QN AUTO: 29.3 PG (ref 27–33)
MCHC RBC AUTO-ENTMCNC: 31.2 G/DL (ref 32–36)
MCV RBC AUTO: 93.7 FL (ref 80–100)
MONOCYTES # BLD AUTO: 324 CELLS/UL (ref 200–950)
MONOCYTES NFR BLD AUTO: 8.1 %
NEUTROPHILS # BLD AUTO: 2332 CELLS/UL (ref 1500–7800)
NEUTROPHILS NFR BLD AUTO: 58.3 %
NITRITE UR QL STRIP: NEGATIVE
PH UR STRIP: 6.5 [PH] (ref 5–8)
PLATELET # BLD AUTO: 227 THOUSAND/UL (ref 140–400)
PMV BLD REES-ECKER: 10.4 FL (ref 7.5–12.5)
POTASSIUM SERPL-SCNC: 4.6 MMOL/L (ref 3.5–5.3)
PROT SERPL-MCNC: 6.7 G/DL (ref 6.1–8.1)
PROT UR QL STRIP: NEGATIVE
RBC # BLD AUTO: 4.92 MILLION/UL (ref 3.8–5.1)
RBC #/AREA URNS HPF: ABNORMAL /HPF
SERVICE CMNT-IMP: ABNORMAL
SODIUM SERPL-SCNC: 141 MMOL/L (ref 135–146)
SP GR UR STRIP: 1 (ref 1–1.03)
SQUAMOUS #/AREA URNS HPF: ABNORMAL /HPF
THYROPEROXIDASE AB SERPL-ACNC: 338 IU/ML
TSH SERPL-ACNC: 3.88 MIU/L (ref 0.4–4.5)
WBC # BLD AUTO: 4 THOUSAND/UL (ref 3.8–10.8)
WBC #/AREA URNS HPF: ABNORMAL /HPF

## 2025-01-30 ENCOUNTER — TELEPHONE (OUTPATIENT)
Dept: PRIMARY CARE | Facility: CLINIC | Age: 75
End: 2025-01-30
Payer: MEDICARE

## 2025-01-30 DIAGNOSIS — N39.0 URINARY TRACT INFECTION WITHOUT HEMATURIA, SITE UNSPECIFIED: Primary | ICD-10-CM

## 2025-01-30 RX ORDER — NITROFURANTOIN 25; 75 MG/1; MG/1
100 CAPSULE ORAL 2 TIMES DAILY
Qty: 14 CAPSULE | Refills: 0 | Status: SHIPPED | OUTPATIENT
Start: 2025-01-30 | End: 2025-02-06

## 2025-01-30 NOTE — TELEPHONE ENCOUNTER
----- Message from Lisette Vieyra sent at 1/30/2025  7:42 AM EST -----  Please advise patient that urine culture shows an infection.  Antibiotic nitrofurantoin sent to the pharmacy.  Follow-up if symptoms persist.

## 2025-02-27 PROBLEM — Z71.2 ENCOUNTER TO DISCUSS TEST RESULTS: Status: RESOLVED | Noted: 2023-11-28 | Resolved: 2025-02-27

## 2025-02-27 PROBLEM — Z76.0 ENCOUNTER FOR MEDICATION REFILL: Status: RESOLVED | Noted: 2024-02-27 | Resolved: 2025-02-27

## 2025-03-10 ENCOUNTER — APPOINTMENT (OUTPATIENT)
Dept: PRIMARY CARE | Facility: CLINIC | Age: 75
End: 2025-03-10
Payer: MEDICARE

## 2025-03-10 VITALS
BODY MASS INDEX: 32.24 KG/M2 | WEIGHT: 175.2 LBS | HEART RATE: 68 BPM | SYSTOLIC BLOOD PRESSURE: 113 MMHG | DIASTOLIC BLOOD PRESSURE: 76 MMHG | TEMPERATURE: 97 F | HEIGHT: 62 IN

## 2025-03-10 DIAGNOSIS — E03.8 SUBCLINICAL HYPOTHYROIDISM: Primary | ICD-10-CM

## 2025-03-10 DIAGNOSIS — N18.31 STAGE 3A CHRONIC KIDNEY DISEASE (MULTI): ICD-10-CM

## 2025-03-10 DIAGNOSIS — R73.03 PRE-DIABETES: ICD-10-CM

## 2025-03-10 DIAGNOSIS — R14.0 BLOATING: ICD-10-CM

## 2025-03-10 PROCEDURE — G0009 ADMIN PNEUMOCOCCAL VACCINE: HCPCS | Performed by: FAMILY MEDICINE

## 2025-03-10 PROCEDURE — 1036F TOBACCO NON-USER: CPT | Performed by: FAMILY MEDICINE

## 2025-03-10 PROCEDURE — 99214 OFFICE O/P EST MOD 30 MIN: CPT | Performed by: FAMILY MEDICINE

## 2025-03-10 PROCEDURE — 90677 PCV20 VACCINE IM: CPT | Performed by: FAMILY MEDICINE

## 2025-03-10 PROCEDURE — 1160F RVW MEDS BY RX/DR IN RCRD: CPT | Performed by: FAMILY MEDICINE

## 2025-03-10 PROCEDURE — 1158F ADVNC CARE PLAN TLK DOCD: CPT | Performed by: FAMILY MEDICINE

## 2025-03-10 PROCEDURE — G2211 COMPLEX E/M VISIT ADD ON: HCPCS | Performed by: FAMILY MEDICINE

## 2025-03-10 PROCEDURE — 1159F MED LIST DOCD IN RCRD: CPT | Performed by: FAMILY MEDICINE

## 2025-03-10 PROCEDURE — 1123F ACP DISCUSS/DSCN MKR DOCD: CPT | Performed by: FAMILY MEDICINE

## 2025-03-10 RX ORDER — LEVOTHYROXINE SODIUM 25 UG/1
25 TABLET ORAL DAILY
Qty: 90 TABLET | Refills: 1 | Status: SHIPPED | OUTPATIENT
Start: 2025-03-10 | End: 2025-09-06

## 2025-03-10 NOTE — ASSESSMENT & PLAN NOTE
Patient's been taking half of levothyroxine 25 mcg daily.  Recommend increasing this to 25 mcg daily and recheck TSH.

## 2025-03-10 NOTE — ASSESSMENT & PLAN NOTE
Patient reports persistent bloating.  Will check pelvic ultrasound to rule out any ovarian pathology and recommend she follow-up with GI.

## 2025-03-10 NOTE — PROGRESS NOTES
Subjective   Patient ID: Lauren Patel is a 74 y.o. female who presents for Follow-up (Dm and hypothroidism./Pt also states was in the [facility] ED on 3/9 for dehydration).  Patient presents today for follow-up on her chronic medical problems.  She also reports that she was in the ER yesterday.  She states that she started having diarrhea and was found to be dehydrated.  States that she is feeling better today.  No further diarrhea.  States she is been able to eat and drink without difficulty.  She denies any chest pain or shortness of breath or abdominal pain.  She states that she does get bloating from time to time.  This is not new.  She also had a UTI in the past.  She is concerned that she may have kidney stones that she occasionally gets back pain.  Her daughter does have kidney stones.  She denies any other concerns.  HPI  Social History     Socioeconomic History    Marital status:      Spouse name: Not on file    Number of children: Not on file    Years of education: Not on file    Highest education level: Not on file   Occupational History    Not on file   Tobacco Use    Smoking status: Never    Smokeless tobacco: Never   Substance and Sexual Activity    Alcohol use: Never    Drug use: Never    Sexual activity: Not on file   Other Topics Concern    Not on file   Social History Narrative    Not on file     Social Drivers of Health     Financial Resource Strain: Not on file   Food Insecurity: Not on file   Transportation Needs: Not on file   Physical Activity: Not on file   Stress: Not on file   Social Connections: Not on file   Intimate Partner Violence: Not on file   Housing Stability: Not on file     Current Outpatient Medications   Medication Sig Dispense Refill    acetaminophen (Tylenol 8 HOUR) 650 mg ER tablet Take 1 tablet (650 mg) by mouth every 8 hours if needed for mild pain (1 - 3). Do not crush, chew, or split.      famotidine (Pepcid) 20 mg tablet Take by mouth.      levothyroxine (Synthroid,  "Levoxyl) 25 mcg tablet Take 1 tablet (25 mcg) by mouth early in the morning.. Take on an empty stomach at the same time each day, either 30 to 60 minutes prior to breakfast 90 tablet 1     No current facility-administered medications for this visit.     Family History   Problem Relation Name Age of Onset    Heart attack Father      Nephrolithiasis Daughter      Vision loss Son       Review of Systems  Immunization History   Administered Date(s) Administered    DT (pediatric) 04/30/2003    Flu vaccine, quadrivalent, high-dose, preservative free, age 65y+ (FLUZONE) 10/09/2020, 10/11/2021, 11/06/2023    Flu vaccine, trivalent, preservative free, HIGH-DOSE, age 65y+ (Fluzone) 11/28/2018, 12/19/2019, 10/22/2024    Influenza, Unspecified 12/02/2013    Influenza, seasonal, injectable 10/28/2014, 02/19/2017    Moderna COVID-19 vaccine, 12 years and older (50mcg/0.5mL)(Spikevax) 10/20/2023    Moderna COVID-19 vaccine, bivalent, blue cap/gray label *Check age/dose* 10/29/2022    Moderna SARS-CoV-2 Vaccination 02/10/2021, 03/10/2021, 05/20/2022    PPD Test 02/12/2008    Pneumococcal conjugate vaccine, 13-valent (PREVNAR 13) 12/03/2019    Pneumococcal conjugate vaccine, 20-valent (PREVNAR 20) 03/10/2025    Pneumococcal polysaccharide vaccine, 23-valent, age 2 years and older (PNEUMOVAX 23) 12/02/2013    Td vaccine, age 7 years and older (TDVAX) 04/30/2003    Tdap vaccine, age 7 year and older (BOOSTRIX, ADACEL) 10/28/2014    Zoster vaccine, recombinant, adult (SHINGRIX) 04/05/2024       Review of Systems negative except as noted in HPI and Chief complaint.     Objective                 /76 (BP Location: Left arm, Patient Position: Sitting)   Pulse 68   Temp 36.1 °C (97 °F)   Ht 1.575 m (5' 2\")   Wt 79.5 kg (175 lb 3.2 oz)   BMI 32.04 kg/m²    Physical Exam  Vitals reviewed.   HENT:      Head: Normocephalic and atraumatic.      Right Ear: Tympanic membrane normal.      Left Ear: Tympanic membrane normal.      Nose: " "Nose normal.      Mouth/Throat:      Pharynx: Oropharynx is clear.   Eyes:      Extraocular Movements: Extraocular movements intact.      Conjunctiva/sclera: Conjunctivae normal.      Pupils: Pupils are equal, round, and reactive to light.   Cardiovascular:      Rate and Rhythm: Normal rate and regular rhythm.      Pulses: Normal pulses.   Pulmonary:      Effort: Pulmonary effort is normal.      Breath sounds: Normal breath sounds.   Abdominal:      General: There is no distension.      Palpations: Abdomen is soft.      Tenderness: There is no abdominal tenderness.   Musculoskeletal:         General: Normal range of motion.      Cervical back: Normal range of motion and neck supple.      Right lower leg: No edema.      Left lower leg: No edema.   Lymphadenopathy:      Cervical: No cervical adenopathy.   Neurological:      General: No focal deficit present.      Mental Status: She is alert.       No results found for this or any previous visit (from the past 96 hours).  Lab Results   Component Value Date    WBC 4.0 01/27/2025    HGB 14.4 01/27/2025    HCT 46.1 (H) 01/27/2025    MCV 93.7 01/27/2025     01/27/2025     Lab Results   Component Value Date    GLUCOSE 96 01/27/2025    CALCIUM 8.9 01/27/2025     01/27/2025    K 4.6 01/27/2025    CO2 28 01/27/2025     01/27/2025    BUN 16 01/27/2025    CREATININE 0.93 01/27/2025     Lab Results   Component Value Date    CHOL 178 12/04/2024    CHOL 177 11/07/2023    CHOL 166 10/11/2021     Lab Results   Component Value Date    HDL 69.7 12/04/2024    HDL 61.2 11/07/2023    HDL 64.0 10/11/2021     Lab Results   Component Value Date    LDLCALC 85 12/04/2024    LDLCALC 94 11/07/2023     Lab Results   Component Value Date    TRIG 119 12/04/2024    TRIG 107 11/07/2023    TRIG 76 10/11/2021     No components found for: \"CHOLHDL\"   Lab Results   Component Value Date    TSH 3.88 01/27/2025      Lab Results   Component Value Date    HGBA1C 5.7 (H) 12/04/2024      No " "results found for: \"ALBUMINUR\"   Assessment/Plan   Problem List Items Addressed This Visit       BMI 32.0-32.9,adult    Stage 3a chronic kidney disease (Multi)     Kidney function mildly decreased at last check.  She was likely dehydrated due to diarrhea.  Will check renal ultrasound and also recheck blood work as ordered.         Relevant Orders    US renal complete    Albumin-Creatinine Ratio, Urine Random    Subclinical hypothyroidism - Primary     Patient's been taking half of levothyroxine 25 mcg daily.  Recommend increasing this to 25 mcg daily and recheck TSH.         Relevant Medications    levothyroxine (Synthroid, Levoxyl) 25 mcg tablet    Other Relevant Orders    TSH with reflex to Free T4 if abnormal    Pre-diabetes     Continue work on healthy diet and exercise.  Check CMP and hemoglobin A1c.         Relevant Orders    Hemoglobin A1C    Comprehensive Metabolic Panel    CBC and Auto Differential    Bloating     Patient reports persistent bloating.  Will check pelvic ultrasound to rule out any ovarian pathology and recommend she follow-up with GI.         Relevant Orders    US pelvis            "

## 2025-03-10 NOTE — ASSESSMENT & PLAN NOTE
Kidney function mildly decreased at last check.  She was likely dehydrated due to diarrhea.  Will check renal ultrasound and also recheck blood work as ordered.

## 2025-03-13 ENCOUNTER — HOSPITAL ENCOUNTER (OUTPATIENT)
Dept: RADIOLOGY | Facility: HOSPITAL | Age: 75
Discharge: HOME | End: 2025-03-13
Payer: MEDICARE

## 2025-03-13 DIAGNOSIS — N18.31 STAGE 3A CHRONIC KIDNEY DISEASE (MULTI): ICD-10-CM

## 2025-03-13 DIAGNOSIS — R14.0 BLOATING: ICD-10-CM

## 2025-03-13 PROCEDURE — 76770 US EXAM ABDO BACK WALL COMP: CPT

## 2025-03-13 PROCEDURE — 76856 US EXAM PELVIC COMPLETE: CPT

## 2025-03-17 DIAGNOSIS — D25.9 UTERINE LEIOMYOMA, UNSPECIFIED LOCATION: Primary | ICD-10-CM

## 2025-03-18 ENCOUNTER — TELEPHONE (OUTPATIENT)
Dept: PRIMARY CARE | Facility: CLINIC | Age: 75
End: 2025-03-18
Payer: MEDICARE

## 2025-03-18 NOTE — TELEPHONE ENCOUNTER
----- Message from Lisette Vieyra sent at 3/17/2025  7:45 AM EDT -----  Please advise patient that she has a uterine fibroid which has increased in size since her last ultrasound.  This may be contributing to her feeling of bloating.  Her ovaries were not seen.  Would recommend she follow-up with gynecology for further evaluation.  Referral ordered.

## 2025-03-18 NOTE — TELEPHONE ENCOUNTER
Spoke with patient   She understood and said thank you    I gave her the phone number to schedule with GYN  She said she seen the report and said it states she refused transvaginal ultrasound, she said she was never asked.

## 2025-03-19 ENCOUNTER — TELEPHONE (OUTPATIENT)
Dept: CARDIOLOGY | Facility: CLINIC | Age: 75
End: 2025-03-19
Payer: MEDICARE

## 2025-03-19 DIAGNOSIS — R14.0 BLOATING: Primary | ICD-10-CM

## 2025-03-19 NOTE — TELEPHONE ENCOUNTER
Patient left vmail stating Transvaginal ultrasound was not done. Said if she gets a new order she will go have it done.

## 2025-03-22 DIAGNOSIS — K22.70 BARRETT'S ESOPHAGUS WITHOUT DYSPLASIA: ICD-10-CM

## 2025-03-22 DIAGNOSIS — K21.00 GASTROESOPHAGEAL REFLUX DISEASE WITH ESOPHAGITIS WITHOUT HEMORRHAGE: ICD-10-CM

## 2025-03-24 RX ORDER — PANTOPRAZOLE SODIUM 40 MG/1
40 TABLET, DELAYED RELEASE ORAL 2 TIMES DAILY
Qty: 180 TABLET | Refills: 1 | Status: SHIPPED | OUTPATIENT
Start: 2025-03-24

## 2025-04-14 ENCOUNTER — APPOINTMENT (OUTPATIENT)
Facility: CLINIC | Age: 75
End: 2025-04-14
Payer: MEDICARE

## 2025-05-22 ENCOUNTER — APPOINTMENT (OUTPATIENT)
Dept: OBSTETRICS AND GYNECOLOGY | Facility: CLINIC | Age: 75
End: 2025-05-22
Payer: MEDICARE

## 2025-05-22 VITALS
HEIGHT: 62 IN | DIASTOLIC BLOOD PRESSURE: 70 MMHG | SYSTOLIC BLOOD PRESSURE: 110 MMHG | BODY MASS INDEX: 31.47 KG/M2 | WEIGHT: 171 LBS

## 2025-05-22 DIAGNOSIS — D25.9 UTERINE LEIOMYOMA, UNSPECIFIED LOCATION: ICD-10-CM

## 2025-05-22 PROBLEM — N81.89 PELVIC FLOOR WEAKNESS IN FEMALE: Status: ACTIVE | Noted: 2025-05-22

## 2025-05-22 PROCEDURE — 99203 OFFICE O/P NEW LOW 30 MIN: CPT | Performed by: OBSTETRICS & GYNECOLOGY

## 2025-05-22 PROCEDURE — 1159F MED LIST DOCD IN RCRD: CPT | Performed by: OBSTETRICS & GYNECOLOGY

## 2025-05-22 PROCEDURE — G2211 COMPLEX E/M VISIT ADD ON: HCPCS | Performed by: OBSTETRICS & GYNECOLOGY

## 2025-05-22 PROCEDURE — 1036F TOBACCO NON-USER: CPT | Performed by: OBSTETRICS & GYNECOLOGY

## 2025-05-22 PROCEDURE — 1160F RVW MEDS BY RX/DR IN RCRD: CPT | Performed by: OBSTETRICS & GYNECOLOGY

## 2025-05-22 PROCEDURE — 1126F AMNT PAIN NOTED NONE PRSNT: CPT | Performed by: OBSTETRICS & GYNECOLOGY

## 2025-05-22 RX ORDER — TALC
3 POWDER (GRAM) TOPICAL NIGHTLY PRN
COMMUNITY

## 2025-05-22 ASSESSMENT — PAIN SCALES - GENERAL: PAINLEVEL_OUTOF10: 0-NO PAIN

## 2025-05-22 NOTE — PROGRESS NOTES
"Subjective   Patient ID: Lauren Patel is a 74 y.o. female who presents for New Patient Visit (NPV- Fibroids///Chaperone Declined: ZANDER Lawton/).  History of Present Illness  Lauren Patel is a 74 year old female who presents with bloating and spontaneous passage of gas.    She experiences bloating and describes a sensation of 'double breathing,' which have been ongoing. An ultrasound was performed, but she was upset as it was not completed as expected; a transvaginal examination was not conducted despite her not refusing it. She has had a similar ultrasound in 2020.    She has experienced spontaneous passage of gas for years. This symptom causes discomfort in social settings, such as [employer], where she feels self-conscious.    Her medication regimen includes Tylenol, Pepcid, levothyroxine 25 mg, pantoprazole 4 mg, and melatonin. She notes a decrease in energy levels since her 's heart attack last year.    She has a history of maintaining her weight for 34 years. She used to be an avid biker and currently uses a stationary bike for exercise, but is cautious about physical activity due to a past back injury.    Review of Systems    Objective     /70   Ht 1.575 m (5' 2\")   Wt 77.6 kg (171 lb)   BMI 31.28 kg/m²     Physical Exam  Constitutional:       Appearance: Normal appearance.   Neurological:      General: No focal deficit present.      Mental Status: She is alert.   Psychiatric:         Mood and Affect: Mood normal.         Behavior: Behavior normal.           Physical Exam        Assessment & Plan  Incontinence of flatus  Incontinence of flatus with increased frequency over the past year, likely due to pelvic floor weakness from previous vaginal deliveries. No gynecologic pathology identified on recent ultrasound. Differential includes pelvic floor dysfunction. Discussed potential benefit of pelvic floor physical therapy to strengthen pelvic floor and support rectal sphincter. She " declined referral for physical therapy but expressed interest in self-directed exercises.  - Provide information on self-directed exercises for pelvic floor strengthening to manage incontinence of flatus.    Uterine fibroid  Uterine fibroid well-managed over five years. Previous ultrasound in 2020 showed fibroid measuring 3x2x2 cm. Current ultrasound shows fibroid measuring 3x3x2.5 cm, indicating minimal change. Transabdominal ultrasound findings are consistent with previous results. No significant growth or concerning features noted. Discussed that the fibroid size is stable and does not require further immediate intervention.    Results  RADIOLOGY  Ultrasound: Fibroid 3x3x2.5 cm  Ultrasound (2020): Fibroid 3x2x2 cm    Carito Ramirez MD     This medical note was created with the assistance of artificial intelligence (AI) for documentation purposes. The content has been reviewed and confirmed by the healthcare provider for accuracy and completeness. Patient consented to the use of audio recording and use of AI during their visit.

## 2025-05-22 NOTE — LETTER
"May 22, 2025     Lisette Vieyra MD  [address]  [facility] [address]    Patient: Lauren Patel   YOB: 1950   Date of Visit: 5/22/2025       Dear Dr. Lisette Vieyra MD:    Thank you for referring Lauren Patel to me for evaluation. Below are my notes for this consultation.  If you have questions, please do not hesitate to call me. I look forward to following your patient along with you.       Sincerely,     Carito Ramirez MD      CC: No Recipients  ______________________________________________________________________________________    Subjective  Patient ID: Lauren Patel is a 74 y.o. female who presents for New Patient Visit (NPV- Fibroids///Chaperone Declined: ZANDER Lawton/).  History of Present Illness  Lauren Patel is a 74 year old female who presents with bloating and spontaneous passage of gas.    She experiences bloating and describes a sensation of 'double breathing,' which have been ongoing. An ultrasound was performed, but she was upset as it was not completed as expected; a transvaginal examination was not conducted despite her not refusing it. She has had a similar ultrasound in 2020.    She has experienced spontaneous passage of gas for years. This symptom causes discomfort in social settings, such as [employer], where she feels self-conscious.    Her medication regimen includes Tylenol, Pepcid, levothyroxine 25 mg, pantoprazole 4 mg, and melatonin. She notes a decrease in energy levels since her 's heart attack last year.    She has a history of maintaining her weight for 34 years. She used to be an avid biker and currently uses a stationary bike for exercise, but is cautious about physical activity due to a past back injury.    Review of Systems    Objective    /70   Ht 1.575 m (5' 2\")   Wt 77.6 kg (171 lb)   BMI 31.28 kg/m²     Physical Exam  Constitutional:       Appearance: Normal appearance.   Neurological:      General: No focal " deficit present.      Mental Status: She is alert.   Psychiatric:         Mood and Affect: Mood normal.         Behavior: Behavior normal.           Physical Exam        Assessment & Plan  Incontinence of flatus  Incontinence of flatus with increased frequency over the past year, likely due to pelvic floor weakness from previous vaginal deliveries. No gynecologic pathology identified on recent ultrasound. Differential includes pelvic floor dysfunction. Discussed potential benefit of pelvic floor physical therapy to strengthen pelvic floor and support rectal sphincter. She declined referral for physical therapy but expressed interest in self-directed exercises.  - Provide information on self-directed exercises for pelvic floor strengthening to manage incontinence of flatus.    Uterine fibroid  Uterine fibroid well-managed over five years. Previous ultrasound in 2020 showed fibroid measuring 3x2x2 cm. Current ultrasound shows fibroid measuring 3x3x2.5 cm, indicating minimal change. Transabdominal ultrasound findings are consistent with previous results. No significant growth or concerning features noted. Discussed that the fibroid size is stable and does not require further immediate intervention.    Results  RADIOLOGY  Ultrasound: Fibroid 3x3x2.5 cm  Ultrasound (2020): Fibroid 3x2x2 cm    Carito Ramirez MD     This medical note was created with the assistance of artificial intelligence (AI) for documentation purposes. The content has been reviewed and confirmed by the healthcare provider for accuracy and completeness. Patient consented to the use of audio recording and use of AI during their visit.

## 2025-05-28 ENCOUNTER — TELEPHONE (OUTPATIENT)
Dept: OBSTETRICS AND GYNECOLOGY | Facility: CLINIC | Age: 75
End: 2025-05-28
Payer: MEDICARE

## 2025-05-28 NOTE — TELEPHONE ENCOUNTER
Per Dr. Ramirez; MD Venus Villagran  This patient's PCP ordered a pelvic ultrasound, transabdominal and transvaginal.  At the appointment in radiology, they did not offer the transvaginal part, even though the patient asked about another part of the exam. In her report, it mentions that the patient refused the transvaginal part, but it was not offered, even when she asked. But she was billed for it.  Do you know who can help her?    RN spoke with patient and she states she contacted patient advocacy and they are handling it.  They will get back with the patient.

## 2025-06-04 LAB
ALBUMIN SERPL-MCNC: 4.3 G/DL (ref 3.6–5.1)
ALBUMIN/CREAT UR: ABNORMAL MG/G CREAT
ALP SERPL-CCNC: 70 U/L (ref 37–153)
ALT SERPL-CCNC: 21 U/L (ref 6–29)
ANION GAP SERPL CALCULATED.4IONS-SCNC: 10 MMOL/L (CALC) (ref 7–17)
AST SERPL-CCNC: 28 U/L (ref 10–35)
BASOPHILS # BLD AUTO: 50 CELLS/UL (ref 0–200)
BASOPHILS NFR BLD AUTO: 1.2 %
BILIRUB SERPL-MCNC: 0.6 MG/DL (ref 0.2–1.2)
BUN SERPL-MCNC: 17 MG/DL (ref 7–25)
CALCIUM SERPL-MCNC: 9.2 MG/DL (ref 8.6–10.4)
CHLORIDE SERPL-SCNC: 104 MMOL/L (ref 98–110)
CO2 SERPL-SCNC: 27 MMOL/L (ref 20–32)
CREAT SERPL-MCNC: 0.96 MG/DL (ref 0.6–1)
CREAT UR-MCNC: 19 MG/DL (ref 20–275)
EGFRCR SERPLBLD CKD-EPI 2021: 61 ML/MIN/1.73M2
EOSINOPHIL # BLD AUTO: 391 CELLS/UL (ref 15–500)
EOSINOPHIL NFR BLD AUTO: 9.3 %
ERYTHROCYTE [DISTWIDTH] IN BLOOD BY AUTOMATED COUNT: 14.3 % (ref 11–15)
EST. AVERAGE GLUCOSE BLD GHB EST-MCNC: 120 MG/DL
EST. AVERAGE GLUCOSE BLD GHB EST-SCNC: 6.6 MMOL/L
GLUCOSE SERPL-MCNC: 84 MG/DL (ref 65–99)
HBA1C MFR BLD: 5.8 %
HCT VFR BLD AUTO: 46.5 % (ref 35–45)
HGB BLD-MCNC: 14.4 G/DL (ref 11.7–15.5)
LYMPHOCYTES # BLD AUTO: 1134 CELLS/UL (ref 850–3900)
LYMPHOCYTES NFR BLD AUTO: 27 %
MCH RBC QN AUTO: 30.3 PG (ref 27–33)
MCHC RBC AUTO-ENTMCNC: 31 G/DL (ref 32–36)
MCV RBC AUTO: 97.7 FL (ref 80–100)
MICROALBUMIN UR-MCNC: <0.2 MG/DL
MONOCYTES # BLD AUTO: 433 CELLS/UL (ref 200–950)
MONOCYTES NFR BLD AUTO: 10.3 %
NEUTROPHILS # BLD AUTO: 2192 CELLS/UL (ref 1500–7800)
NEUTROPHILS NFR BLD AUTO: 52.2 %
PLATELET # BLD AUTO: 202 THOUSAND/UL (ref 140–400)
PMV BLD REES-ECKER: 10.2 FL (ref 7.5–12.5)
POTASSIUM SERPL-SCNC: 5.2 MMOL/L (ref 3.5–5.3)
PROT SERPL-MCNC: 6.9 G/DL (ref 6.1–8.1)
RBC # BLD AUTO: 4.76 MILLION/UL (ref 3.8–5.1)
SODIUM SERPL-SCNC: 141 MMOL/L (ref 135–146)
TSH SERPL-ACNC: 4.19 MIU/L (ref 0.4–4.5)
WBC # BLD AUTO: 4.2 THOUSAND/UL (ref 3.8–10.8)

## 2025-06-05 ENCOUNTER — TELEPHONE (OUTPATIENT)
Dept: PRIMARY CARE | Facility: CLINIC | Age: 75
End: 2025-06-05
Payer: MEDICARE

## 2025-06-05 NOTE — TELEPHONE ENCOUNTER
----- Message from Lisette Vieyra sent at 6/5/2025  8:05 AM EDT -----  Please advise patient that blood sugar is mildly elevated.  Other blood work is okay.  Will discuss further at follow-up  ----- Message -----  From: [facility] Results In  Sent: 6/4/2025   6:02 AM EDT  To: Lisette Vieyra MD

## 2025-06-06 PROBLEM — N18.31 STAGE 3A CHRONIC KIDNEY DISEASE (MULTI): Status: RESOLVED | Noted: 2023-11-07 | Resolved: 2025-06-06

## 2025-06-12 ENCOUNTER — APPOINTMENT (OUTPATIENT)
Dept: PRIMARY CARE | Facility: CLINIC | Age: 75
End: 2025-06-12
Payer: MEDICARE

## 2025-06-12 VITALS
WEIGHT: 171.8 LBS | BODY MASS INDEX: 31.62 KG/M2 | HEART RATE: 59 BPM | SYSTOLIC BLOOD PRESSURE: 116 MMHG | DIASTOLIC BLOOD PRESSURE: 72 MMHG | HEIGHT: 62 IN | TEMPERATURE: 96.6 F

## 2025-06-12 DIAGNOSIS — Z00.00 HEALTHCARE MAINTENANCE: Primary | ICD-10-CM

## 2025-06-12 DIAGNOSIS — E03.8 SUBCLINICAL HYPOTHYROIDISM: ICD-10-CM

## 2025-06-12 DIAGNOSIS — Z00.00 ROUTINE GENERAL MEDICAL EXAMINATION AT HEALTH CARE FACILITY: ICD-10-CM

## 2025-06-12 DIAGNOSIS — M54.32 SCIATICA OF LEFT SIDE: ICD-10-CM

## 2025-06-12 DIAGNOSIS — R53.83 FATIGUE, UNSPECIFIED TYPE: ICD-10-CM

## 2025-06-12 DIAGNOSIS — R06.02 SOBOE (SHORTNESS OF BREATH ON EXERTION): ICD-10-CM

## 2025-06-12 DIAGNOSIS — E55.9 VITAMIN D INSUFFICIENCY: ICD-10-CM

## 2025-06-12 DIAGNOSIS — R73.03 PRE-DIABETES: ICD-10-CM

## 2025-06-12 DIAGNOSIS — Z13.220 LIPID SCREENING: ICD-10-CM

## 2025-06-12 PROBLEM — M25.552 LEFT HIP PAIN: Status: ACTIVE | Noted: 2025-06-12

## 2025-06-12 RX ORDER — ALUMINUM HYDROXIDE, MAGNESIUM HYDROXIDE, AND SIMETHICONE 1200; 120; 1200 MG/30ML; MG/30ML; MG/30ML
SUSPENSION ORAL EVERY 6 HOURS PRN
COMMUNITY

## 2025-06-12 ASSESSMENT — ACTIVITIES OF DAILY LIVING (ADL)
DOING_HOUSEWORK: INDEPENDENT
GROCERY_SHOPPING: INDEPENDENT
TAKING_MEDICATION: INDEPENDENT
DRESSING: INDEPENDENT
MANAGING_FINANCES: INDEPENDENT
BATHING: INDEPENDENT

## 2025-06-12 ASSESSMENT — PATIENT HEALTH QUESTIONNAIRE - PHQ9
1. LITTLE INTEREST OR PLEASURE IN DOING THINGS: NOT AT ALL
2. FEELING DOWN, DEPRESSED OR HOPELESS: NOT AT ALL
SUM OF ALL RESPONSES TO PHQ9 QUESTIONS 1 AND 2: 0

## 2025-06-12 NOTE — ASSESSMENT & PLAN NOTE
TSH normal.  Continue with current dose of Synthroid.    Orders:    Comprehensive Metabolic Panel; Future    CBC and Auto Differential; Future    TSH with reflex to Free T4 if abnormal; Future

## 2025-06-12 NOTE — ASSESSMENT & PLAN NOTE
Discussed with patient that this could be related to her heart.  Recommend checking stress and echo along with chest x-ray.  Coronary artery calcium score also ordered.  Discussed with patient if symptoms become severe, call 911 or go to the ER.    Orders:    ECG 12 lead (Clinic Performed)    CT cardiac scoring wo IV contrast; Future    Transthoracic Echo (TTE) Complete; Future    Stress Test; Future    XR chest 2 views; Future

## 2025-06-12 NOTE — PROGRESS NOTES
"Subjective   Reason for Visit: Lauren Patel is an 74 y.o. female here for a Medicare Wellness visit.  She presents today for an annual wellness visit and follow-up on her chronic medical problems.  She reports that she has been feeling more tired.  She states that she feels out of breath with activity such as going up the stairs.  She states that she rests for a few minutes and it goes away.  She denies any chest pain.  No dizziness or palpitations.  She also reports that she has been having some pain in her left lower back that radiates down her leg.  No numbness or tingling or weakness.  No bowel or bladder incontinence.  She denies any abdominal pain.  States that she eats healthy and stays active otherwise.  Reports mood is good.  She denies any other concerns.    Past Medical, Surgical, and Family History reviewed and updated in chart.    Reviewed all medications by prescribing practitioner or clinical pharmacist (such as prescriptions, OTCs, herbal therapies and supplements) and documented in the medical record.    HPI    Patient Care Team:  Connie Capone MD as PCP - General (Internal Medicine)  Connie Capone MD as PCP - [employer] PCP     Review of Systems    Objective   Vitals:  /72 (BP Location: Left arm, Patient Position: Sitting)   Pulse 59   Temp 35.9 °C (96.6 °F)   Ht 1.575 m (5' 2\")   Wt 77.9 kg (171 lb 12.8 oz)   BMI 31.42 kg/m²       Physical Exam  Vitals reviewed.   HENT:      Head: Normocephalic and atraumatic.      Right Ear: Tympanic membrane normal.      Left Ear: Tympanic membrane normal.      Nose: Nose normal.      Mouth/Throat:      Pharynx: Oropharynx is clear.   Eyes:      Extraocular Movements: Extraocular movements intact.      Conjunctiva/sclera: Conjunctivae normal.      Pupils: Pupils are equal, round, and reactive to light.   Cardiovascular:      Rate and Rhythm: Normal rate and regular rhythm.      Pulses: Normal pulses.   Pulmonary:      " Effort: Pulmonary effort is normal.      Breath sounds: Normal breath sounds.   Abdominal:      General: There is no distension.      Palpations: Abdomen is soft.      Tenderness: There is no abdominal tenderness.   Musculoskeletal:         General: Normal range of motion.      Cervical back: Normal range of motion and neck supple.      Right lower leg: No edema.      Left lower leg: No edema.   Lymphadenopathy:      Cervical: No cervical adenopathy.   Neurological:      General: No focal deficit present.      Mental Status: She is alert.         Assessment & Plan  Healthcare maintenance  Continue with healthy diet and exercise.  Screening blood work up-to-date.  Mammogram up-to-date.  Immunizations up-to-date.         Pre-diabetes  Blood sugar remains mildly elevated.  Continue to work on healthy diet.  Repeat CMP and hemoglobin A1c.    Orders:    Hemoglobin A1C; Future    Subclinical hypothyroidism  TSH normal.  Continue with current dose of Synthroid.    Orders:    Comprehensive Metabolic Panel; Future    CBC and Auto Differential; Future    TSH with reflex to Free T4 if abnormal; Future    SOBOE (shortness of breath on exertion)  Discussed with patient that this could be related to her heart.  Recommend checking stress and echo along with chest x-ray.  Coronary artery calcium score also ordered.  Discussed with patient if symptoms become severe, call 911 or go to the ER.    Orders:    ECG 12 lead (Clinic Performed)    CT cardiac scoring wo IV contrast; Future    Transthoracic Echo (TTE) Complete; Future    Stress Test; Future    XR chest 2 views; Future    Vitamin D insufficiency  Recheck vitamin D level.    Orders:    Vitamin D 25-Hydroxy,Total (for eval of Vitamin D levels); Future    Sciatica of left side  Patient with tenderness over the left lumbar spine.  Negative straight leg raise.  Normal reflexes.  Will check x-ray of the lumbar spine and patient referred to physical therapy.    Orders:    XR lumbar  spine 2-3 views; Future    Referral to Physical Therapy; Future    Fatigue, unspecified type    Orders:    Vitamin B12; Future    Lipid screening    Orders:    Lipid Panel; Future    Routine general medical examination at health care facility    Orders:    1 Year Follow Up In Primary Care - Wellness Exam; Future

## 2025-06-12 NOTE — ASSESSMENT & PLAN NOTE
Blood sugar remains mildly elevated.  Continue to work on healthy diet.  Repeat CMP and hemoglobin A1c.    Orders:    Hemoglobin A1C; Future

## 2025-06-12 NOTE — ASSESSMENT & PLAN NOTE
Continue with healthy diet and exercise.  Screening blood work up-to-date.  Mammogram up-to-date.  Immunizations up-to-date.

## 2025-06-12 NOTE — ASSESSMENT & PLAN NOTE
Recheck vitamin D level.    Orders:    Vitamin D 25-Hydroxy,Total (for eval of Vitamin D levels); Future

## 2025-06-12 NOTE — ASSESSMENT & PLAN NOTE
Patient with tenderness over the left lumbar spine.  Negative straight leg raise.  Normal reflexes.  Will check x-ray of the lumbar spine and patient referred to physical therapy.    Orders:    XR lumbar spine 2-3 views; Future    Referral to Physical Therapy; Future

## 2025-06-25 ENCOUNTER — EVALUATION (OUTPATIENT)
Dept: PHYSICAL THERAPY | Facility: CLINIC | Age: 75
End: 2025-06-25
Payer: MEDICARE

## 2025-06-25 DIAGNOSIS — M54.32 SCIATICA OF LEFT SIDE: ICD-10-CM

## 2025-06-25 PROCEDURE — 97161 PT EVAL LOW COMPLEX 20 MIN: CPT | Mod: GP

## 2025-06-25 PROCEDURE — 97110 THERAPEUTIC EXERCISES: CPT | Mod: GP

## 2025-06-25 NOTE — PROGRESS NOTES
Physical Therapy Evaluation and Treatment      Patient Name: Lauren Patel  MRN: [id number]  Today's Date: 6/25/2025  Visit #1  Time Calculation  Start Time: 0845  Stop Time: 1000  Time Calculation (min): 75 min    Insurance:  2025 30 COPAY, 0 DED, 4150 OOP MAX, MC 90 DAY 9/25/2025, AUTH REQ W IN 2 BUS DAYS     Assessment:  Ms Patel is a 74 year old female referred to outpatient physical therapy for L sided low back pain L sciatic involvement that began 6/5/2025.  Pt denies hsx of trauma reports onset occurred with mowing lawn, going up hill, since the pain has began to improve, but sleeping is still affected at this time.   Examination findings are consistent with decreased LE strength, impaired LE ROM, impaired lumbar AROM.    Based on objective outcome measures ANNI pt scored 11/50 indicating minimal functional impairment.  Patient will benefit from physical therapy services to improve listed impairments.  Initiated treatment today to address these impairments.  Pt had favorable response to exercises, was able to teach back and provided printed handout.     Patient with the following impairments: decreased muscle performance, decreased ROM, decreased activity tolerance, pain, participation restrictions, and difficulty with ADL completion    Patient's response to session: Decreased pain, Increased ROM/joint mobility, Increase motor control, and Increased knowledge and understanding    Plan:     1) check tolerance to HEP   Continue repeated FLX   Current Problem:   1. Sciatica of left side  Referral to Physical Therapy    Follow Up In Physical Therapy          Subjective   Patient presenting today for L sided low back pain.  On June 5th I was mowing the lawn, and got, SOB and it was painful in the back, my dr. Thought it was sciatica, mostly on the on the L side, and a little bit on the R side.  I initially had a hard time sleeping, hasn't really improved, 2-3 hours of sleep. I have GERD so I am already, having a  hard time sleeping.  Pt denies numbness and tingling.  Center of the back and to the L hip is where is find the most pain.  About 3 years ago, I had similar experience, I was doing exercises, while on the ground, and I stood up and I had this terrible pain.   Patient denies numbness/tingling, changes in bowel/bladder, saddle paresthesias, and falls.     Pain now: 4-5/10 Pain is better with sleeping on back.   Pain worse: 9-10/10 Pain is worse with lawn work, and going up hill, going up the stairs, walking,     Patient personal Goals:  1) I would like to have no pain in the back.  2) I would like to be able to sleep again.              Objective   Medical History[1]  Surgical History[2]    Spine Musculoskeletal Exam    Range of Motion    Thoracolumbar       Flexion: 75%. Flexion detail: guarding.     Extension: 50%. Extension detail: no pain.       Right      Lateral bendin%.       Lateral rotation: 75%. Lateral rotation detail: no pain.       Left      Lateral bendin%.       Lateral rotation: 75%. Lateral rotation detail: no pain.      Sensory    Thoracolumbar      Left      Medial leg: decreased    Spine sensation additional comments: Most distal radiation L below knee    Reflexes    Right      Quadriceps: 2/4      Achilles: 2/4     Left      Quadriceps: 1/4      Achilles: 2/4    Special Tests    Thoracolumbar      Left      SLR: back pain and pain radiates to left leg      SLR pain at: 50 degrees    Spine special tests additional comments: + SLUMP L     MMT and ROM  Joint Strength /5   Hip: R L   Flexion 3+/5 3/5   Extension 3/5 3/5   ABD 3+/5 3/5   IR     ER          Knee: R L   Extension 3+/5 3+/5   Flexion 3-/5 3-/5        Ankle:  R L   DF 3+/5 3+/5   PF        Outcome Measures:       Other Measures  5x Sit to Stand: 32  Oswestry Disablity Index (ANNI): 11     Treatments:  Therapeutic Exercise (30963): 10 minutes  Prone press up: peripheralizes  Lumbar flexion with swiss ball  Piriformis  stretch  Supine and seated posterior pelvic tilt.     Manual Therapy (00654):   minutes      Neuromuscular Re-education (10703):   minutes      Education and discussion on HEP and treatment regarding the benefits related to current condition, POC, pathophysiology, and precautions  *added to HEP  Access Code: [email or web address]  URL: [email or web address]  Date: 06/25/2025  Prepared by: Barrie Holcomb    Exercises  - Seated Flexion Stretch with Swiss Ball  - 1 x daily - 7 x weekly - 2 sets - 10 reps  - Supine Posterior Pelvic Tilt  - 1 x daily - 7 x weekly - 2 sets - 10 reps  - Seated Posterior Pelvic Tilt  - 1 x daily - 7 x weekly - 2 sets - 10 reps    Goals:  Patient will report decreased Modified Oswestry Low Back Pain Disability Index score to meet MDC: 13 improvement of ability to improve ambulatory endurance within 12 visits.     Patient will be independent with home exercise program for proper self-management of condition within 8 visits.     Patient will improve active range of motion in lumbar ROM WFL without increase in tissue irritation for improved ADL tolerance within 12 visits.     Patient will improve LE strength in muscles tested X>=4/5 for improved ambulatory endurance within 12 visits.     Patient will report improved pain rating X<=1-2/10 in 24 hour period for improved sleep quality within 12 visits.          [1]   Past Medical History:  Diagnosis Date    GERD (gastroesophageal reflux disease)     Hypothyroid    [2]   Past Surgical History:  Procedure Laterality Date    COLONOSCOPY      ESOPHAGOGASTRODUODENOSCOPY

## 2025-06-30 ENCOUNTER — TREATMENT (OUTPATIENT)
Dept: PHYSICAL THERAPY | Facility: CLINIC | Age: 75
End: 2025-06-30
Payer: MEDICARE

## 2025-06-30 DIAGNOSIS — M54.32 SCIATICA OF LEFT SIDE: ICD-10-CM

## 2025-06-30 PROCEDURE — 97110 THERAPEUTIC EXERCISES: CPT | Mod: GP

## 2025-06-30 NOTE — PROGRESS NOTES
"Physical Therapy Treatment      Patient Name: Lauren Patel  MRN: [id number]  Today's Date: 6/30/2025  Visit #2/6  Time Calculation  Start Time: 1350  Stop Time: 1429  Time Calculation (min): 39 min    Insurance:  2025 30 COPAY, 0 DED, 4150 OOP MAX, MC 90 DAY 9/25/2025, AUTH 6 VS 6/25-8/23/2025     Assessment:  Initiated physical therapy this session reviewed HEP esatablished at evaluation, in which the pt reports compliance with slight improvement in symptoms.  Added piriformis stretch modified, in which the pt was able to complete in supine, but too painful in sitting.  Pt experienced increased radiating symptoms when completing glut bridges, discontinued for now.  Pt had difficulty with transferring, taking increased amount of time with bed mobility, limiting some of the interventions completed this session.  Updated HEP, provided printed handout.  Pt is making progress towards goals and would continue to benefit from skilled PT at this time for remaining sciatic involvement L side.     Patient's response to session: No change in pain, Increased ROM/joint mobility, and Increased knowledge and understanding    Plan:  1) check tolerance to HEP              Continue repeated FLX    Current Problem:   1. Sciatica of left side  Follow Up In Physical Therapy          Subjective     Pt reports, \"I think slowly things are getting better, but it depends how I am moving.  I have tried laying down and working on the stretch\"     Pain: 6/10       Objective       Treatments:  Therapeutic Exercise (80941): 39 minutes  Iso hip ABD   5s hold, 2 x 10  Hip FLX with swiss ball  2 x 10  Supine modified piriformis stretch:  12 reps, 5s hold  Lumbar stretch with swiss ball (repeated)  2 x 10  Nu Step  9 min  Bridging:   2 x 10 (pt reported increased L LE symptoms).     Manual Therapy (17918):   minutes      Neuromuscular Re-education (22308):   minutes      Education and discussion on HEP and treatment regarding the benefits related " to current condition, POC, pathophysiology, and precautions    Access Code: [email or web address]  URL: [email or web address]  Date: 06/25/2025  Prepared by: Barrie Holcomb     Exercises  - Seated Flexion Stretch with Swiss Ball  - 1 x daily - 7 x weekly - 2 sets - 10 reps  - Supine Posterior Pelvic Tilt  - 1 x daily - 7 x weekly - 2 sets - 10 reps  - Seated Posterior Pelvic Tilt  - 1 x daily - 7 x weekly - 2 sets - 10 reps

## 2025-07-07 ENCOUNTER — TREATMENT (OUTPATIENT)
Dept: PHYSICAL THERAPY | Facility: CLINIC | Age: 75
End: 2025-07-07
Payer: MEDICARE

## 2025-07-07 DIAGNOSIS — M54.32 SCIATICA OF LEFT SIDE: ICD-10-CM

## 2025-07-07 PROCEDURE — 97110 THERAPEUTIC EXERCISES: CPT | Mod: GP

## 2025-07-07 NOTE — PROGRESS NOTES
"Physical Therapy Treatment      Patient Name: Lauren Patle  MRN: [id number]  Today's Date: 7/7/2025  Visit #3/6  Time Calculation  Start Time: 1450  Stop Time: 1522  Time Calculation (min): 32 min    Insurance:  2025 30 COPAY, 0 DED, 4150 OOP MAX, MC 90 DAY 9/25/2025, AUTH 6 VS 6/25-8/23/2025     Assessment:  Continued physical therapy this session in which pt report improvement in symptoms, however reporting increased pain rating compared to previous session.  Added figure 4 stretch in supine, in which the pt reports, slight stretch response.  Updated HEP, provided printed handout.  Pt is making progress towards goals and would continue to benefit from skilled PT at this time for remaining sciatic involvement L side. Pt is making progress towards goals and would continue to benefit from skilled PT at this time.     Patient's response to session: No change in pain, Increased ROM/joint mobility, and Increased knowledge and understanding    Plan:  1) check tolerance to HEP              Continue repeated FLX    Current Problem:   1. Sciatica of left side  Follow Up In Physical Therapy            Subjective     Pt reports, \"I think that things have been improving less, pain in the lower back\"     Pain: 7/10       Objective       Treatments:  Therapeutic Exercise (21001): 32 minutes  Supine Piriformis stretch:  1 x 10 (5s hold).   Supine Figure 4 piriformis stretch:  1 x 10 (5s)  Iso hip ABD (increased pain in familiar area).   5s hold, 2 x 10  Hip FLX with swiss ball  2 x 10  Supine modified piriformis stretch:  12 reps, 5s hold  Lumbar stretch with swiss ball (repeated)  2 x 10  Nu Step  9 min  Bridging:   2 x 10 (pt reported increased L LE symptoms).   Doubles knees to chest with swiss ball:  20 reps.     Manual Therapy (20292):   minutes      Neuromuscular Re-education (83950):   minutes      Education and discussion on HEP and treatment regarding the benefits related to current condition, POC, pathophysiology, and " precautions    Access Code: [email or web address]  URL: [email or web address]  Date: 07/07/2025  Prepared by: Barrie Holcomb    Exercises  - Seated Flexion Stretch with Swiss Ball  - 1 x daily - 7 x weekly - 2 sets - 10 reps  - Supine Posterior Pelvic Tilt  - 1 x daily - 7 x weekly - 2 sets - 10 reps  - Seated Posterior Pelvic Tilt  - 1 x daily - 7 x weekly - 2 sets - 10 reps  - Supine Figure 4 Piriformis Stretch  - 1 x daily - 7 x weekly - 1 sets - 12 reps - 5s-10s hold  - Supine Piriformis Stretch with Foot on Ground  - 1 x daily - 7 x weekly - 1 sets - 12 reps - 5s-10s hold

## 2025-07-09 ENCOUNTER — HOSPITAL ENCOUNTER (OUTPATIENT)
Dept: CARDIOLOGY | Facility: CLINIC | Age: 75
Discharge: HOME | End: 2025-07-09
Payer: MEDICARE

## 2025-07-09 DIAGNOSIS — R06.02 SOBOE (SHORTNESS OF BREATH ON EXERTION): ICD-10-CM

## 2025-07-09 PROCEDURE — 93018 CV STRESS TEST I&R ONLY: CPT | Performed by: INTERNAL MEDICINE

## 2025-07-09 PROCEDURE — 93016 CV STRESS TEST SUPVJ ONLY: CPT | Performed by: INTERNAL MEDICINE

## 2025-07-09 PROCEDURE — 93017 CV STRESS TEST TRACING ONLY: CPT

## 2025-07-12 ENCOUNTER — HOSPITAL ENCOUNTER (OUTPATIENT)
Dept: RADIOLOGY | Facility: HOSPITAL | Age: 75
Discharge: HOME | End: 2025-07-12
Payer: MEDICARE

## 2025-07-12 DIAGNOSIS — R06.02 SOBOE (SHORTNESS OF BREATH ON EXERTION): ICD-10-CM

## 2025-07-12 PROCEDURE — 75571 CT HRT W/O DYE W/CA TEST: CPT

## 2025-07-14 ENCOUNTER — TELEPHONE (OUTPATIENT)
Dept: PRIMARY CARE | Facility: CLINIC | Age: 75
End: 2025-07-14

## 2025-07-14 ENCOUNTER — TREATMENT (OUTPATIENT)
Dept: PHYSICAL THERAPY | Facility: CLINIC | Age: 75
End: 2025-07-14
Payer: MEDICARE

## 2025-07-14 ENCOUNTER — HOSPITAL ENCOUNTER (OUTPATIENT)
Dept: CARDIOLOGY | Facility: CLINIC | Age: 75
Discharge: HOME | End: 2025-07-14
Payer: MEDICARE

## 2025-07-14 VITALS
DIASTOLIC BLOOD PRESSURE: 68 MMHG | WEIGHT: 172 LBS | HEIGHT: 62 IN | BODY MASS INDEX: 31.65 KG/M2 | SYSTOLIC BLOOD PRESSURE: 116 MMHG

## 2025-07-14 DIAGNOSIS — M54.32 SCIATICA OF LEFT SIDE: ICD-10-CM

## 2025-07-14 DIAGNOSIS — R06.02 SOBOE (SHORTNESS OF BREATH ON EXERTION): ICD-10-CM

## 2025-07-14 LAB — BODY SURFACE AREA: 1.85 M2

## 2025-07-14 PROCEDURE — 97110 THERAPEUTIC EXERCISES: CPT | Mod: GP

## 2025-07-14 PROCEDURE — 93306 TTE W/DOPPLER COMPLETE: CPT

## 2025-07-14 NOTE — TELEPHONE ENCOUNTER
----- Message from Lisette Vieyra sent at 7/14/2025  3:14 PM EDT -----  Please advise patient that coronary artery calcium score is 61 which is considered low risk for coronary artery disease.  CT was otherwise unremarkable.  Please have her schedule a follow-up   appointment to discuss.  ----- Message -----  From: Interface, Radiology Results In  Sent: 7/14/2025   3:08 PM EDT  To: Lisette Vieyar MD

## 2025-07-14 NOTE — PROGRESS NOTES
"Physical Therapy Treatment      Patient Name: Lauren Patel  MRN: [id number]  Today's Date: 7/14/2025  Visit #4/6  Time Calculation  Start Time: 1343  Stop Time: 1421  Time Calculation (min): 38 min    Insurance:  2025 30 COPAY, 0 DED, 4150 OOP MAX, MC 90 DAY 9/25/2025, AUTH 6 VS 6/25-8/23/2025     Assessment:  Continued physical therapy this session in which pt report improvement in symptoms.  Pt reporting improvement in symptoms, down from 7-8/10 to 2/10 on average.  Began adding abdominal strengthening exercises in which the pt was able to teach back.  Pt still reporting, R sided low back pain with supine to sit transfers, recommending pt utilize log roll method to minimize spinal twisting when getting out of bed, in which the pt verbally understood. Pt is making progress towards goals and would continue to benefit from skilled PT at this time.     Patient's response to session: No change in pain, Increased ROM/joint mobility, and Increased knowledge and understanding    Plan:  1) check tolerance to HEP              Continue repeated FLX    Current Problem:   1. Sciatica of left side  Follow Up In Physical Therapy              Subjective     Pt reports, \"I was doing the exercises this morning, and I feel like I pulled something but it felt good. \"     Pain: 2-3/10       Objective       Treatments:  Therapeutic Exercise (18386): 38 minutes  Supine Piriformis stretch: (verbally reviewed).   1 x 10 (5s hold).   Supine Figure 4 piriformis stretch: (verbally reviewed)  1 x 10 (5s)  Hip FLX with swiss ball  2 x 10  Supine modified piriformis stretch:  12 reps, 5s hold  Lumbar stretch with swiss ball (repeated)  2 x 10  Nu Step  9 min, Seat 7, resistance: 2-3  Doubles knees to chest with swiss ball:  20 reps.   Supine Marching with TrA activation  50 reps.   Abdominal press with swiss ball:  2 x 10 (3s hold)  Resisted Rowing, cueing for core stabilization:  20 reps (yellow TB).     Manual Therapy (95282):   " minutes      Neuromuscular Re-education (49583):   minutes      Education and discussion on HEP and treatment regarding the benefits related to current condition, POC, pathophysiology, and precautions    Access Code: [email or web address]  URL: [email or web address]  Date: 07/14/2025  Prepared by: Barrie Holcomb    Exercises  - Seated Flexion Stretch with Swiss Ball  - 1 x daily - 7 x weekly - 2 sets - 10 reps  - Supine Posterior Pelvic Tilt  - 1 x daily - 7 x weekly - 2 sets - 10 reps  - Seated Posterior Pelvic Tilt  - 1 x daily - 7 x weekly - 2 sets - 10 reps  - Supine Figure 4 Piriformis Stretch  - 1 x daily - 7 x weekly - 1 sets - 12 reps - 5s-10s hold  - Supine Piriformis Stretch with Foot on Ground  - 1 x daily - 7 x weekly - 1 sets - 12 reps - 5s-10s hold  - Supine 90/90 Alternating Toe Touch  - 1 x daily - 7 x weekly - 2 sets - 10 reps  - Standing Anti-Rotation Press with Anchored Resistance  - 1 x daily - 7 x weekly - 2 sets - 10 reps

## 2025-07-14 NOTE — TELEPHONE ENCOUNTER
----- Message from Lisette Vieyra sent at 7/10/2025  7:55 AM EDT -----  Please advise patient that her stress test did not show any signs of ischemia.  However she did not reach the maximal heart rate for the study.  Would recommend she follow-up with me or Dr. Capone after completing all her testing.  ----- Message -----  From: Interface, [facility] - Cardiology Results In  Sent: 7/9/2025   5:24 PM EDT  To: Lisette Vieyra MD

## 2025-07-16 ENCOUNTER — RESULTS FOLLOW-UP (OUTPATIENT)
Dept: PRIMARY CARE | Facility: CLINIC | Age: 75
End: 2025-07-16
Payer: MEDICARE

## 2025-07-16 LAB
25(OH)D3+25(OH)D2 SERPL-MCNC: 35 NG/ML (ref 30–100)
ALBUMIN SERPL-MCNC: 4.2 G/DL (ref 3.6–5.1)
ALP SERPL-CCNC: 58 U/L (ref 37–153)
ALT SERPL-CCNC: 16 U/L (ref 6–29)
ANION GAP SERPL CALCULATED.4IONS-SCNC: 6 MMOL/L (CALC) (ref 7–17)
AST SERPL-CCNC: 21 U/L (ref 10–35)
BASOPHILS # BLD AUTO: 40 CELLS/UL (ref 0–200)
BASOPHILS NFR BLD AUTO: 1 %
BILIRUB SERPL-MCNC: 0.8 MG/DL (ref 0.2–1.2)
BUN SERPL-MCNC: 15 MG/DL (ref 7–25)
CALCIUM SERPL-MCNC: 9.1 MG/DL (ref 8.6–10.4)
CHLORIDE SERPL-SCNC: 105 MMOL/L (ref 98–110)
CHOLEST SERPL-MCNC: 163 MG/DL
CHOLEST/HDLC SERPL: 2.5 (CALC)
CO2 SERPL-SCNC: 29 MMOL/L (ref 20–32)
CREAT SERPL-MCNC: 1.1 MG/DL (ref 0.6–1)
EGFRCR SERPLBLD CKD-EPI 2021: 52 ML/MIN/1.73M2
EOSINOPHIL # BLD AUTO: 328 CELLS/UL (ref 15–500)
EOSINOPHIL NFR BLD AUTO: 8.2 %
ERYTHROCYTE [DISTWIDTH] IN BLOOD BY AUTOMATED COUNT: 13.7 % (ref 11–15)
EST. AVERAGE GLUCOSE BLD GHB EST-MCNC: 123 MG/DL
EST. AVERAGE GLUCOSE BLD GHB EST-SCNC: 6.8 MMOL/L
GLUCOSE SERPL-MCNC: 94 MG/DL (ref 65–99)
HBA1C MFR BLD: 5.9 %
HCT VFR BLD AUTO: 44.6 % (ref 35–45)
HDLC SERPL-MCNC: 64 MG/DL
HGB BLD-MCNC: 14 G/DL (ref 11.7–15.5)
LDLC SERPL CALC-MCNC: 83 MG/DL (CALC)
LYMPHOCYTES # BLD AUTO: 1076 CELLS/UL (ref 850–3900)
LYMPHOCYTES NFR BLD AUTO: 26.9 %
MCH RBC QN AUTO: 30.4 PG (ref 27–33)
MCHC RBC AUTO-ENTMCNC: 31.4 G/DL (ref 32–36)
MCV RBC AUTO: 97 FL (ref 80–100)
MONOCYTES # BLD AUTO: 380 CELLS/UL (ref 200–950)
MONOCYTES NFR BLD AUTO: 9.5 %
NEUTROPHILS # BLD AUTO: 2176 CELLS/UL (ref 1500–7800)
NEUTROPHILS NFR BLD AUTO: 54.4 %
NONHDLC SERPL-MCNC: 99 MG/DL (CALC)
PLATELET # BLD AUTO: 225 THOUSAND/UL (ref 140–400)
PMV BLD REES-ECKER: 9.9 FL (ref 7.5–12.5)
POTASSIUM SERPL-SCNC: 4.6 MMOL/L (ref 3.5–5.3)
PROT SERPL-MCNC: 6.7 G/DL (ref 6.1–8.1)
RBC # BLD AUTO: 4.6 MILLION/UL (ref 3.8–5.1)
SODIUM SERPL-SCNC: 140 MMOL/L (ref 135–146)
T4 FREE SERPL-MCNC: 1 NG/DL (ref 0.8–1.8)
TRIGL SERPL-MCNC: 79 MG/DL
TSH SERPL-ACNC: 7.3 MIU/L (ref 0.4–4.5)
VIT B12 SERPL-MCNC: 533 PG/ML (ref 200–1100)
WBC # BLD AUTO: 4 THOUSAND/UL (ref 3.8–10.8)

## 2025-07-16 NOTE — TELEPHONE ENCOUNTER
----- Message from Lisette Vieyra sent at 7/16/2025  9:27 AM EDT -----  Please advise patient that blood sugar is mildly elevated.  Kidney function is mildly decreased.  This may be from dehydration.  Recommend increasing her fluid intake.  Her thyroid is borderline low.    Other blood work is okay.  Keep follow-up appointment with Dr. Capone next week to review.  ----- Message -----  From: [facility] Results In  Sent: 7/16/2025   2:46 AM EDT  To: Lisette Vieyra MD

## 2025-07-21 ENCOUNTER — TREATMENT (OUTPATIENT)
Dept: PHYSICAL THERAPY | Facility: CLINIC | Age: 75
End: 2025-07-21
Payer: MEDICARE

## 2025-07-21 ENCOUNTER — APPOINTMENT (OUTPATIENT)
Dept: PRIMARY CARE | Facility: CLINIC | Age: 75
End: 2025-07-21
Payer: MEDICARE

## 2025-07-21 VITALS
HEIGHT: 62 IN | SYSTOLIC BLOOD PRESSURE: 115 MMHG | WEIGHT: 171.8 LBS | BODY MASS INDEX: 31.62 KG/M2 | TEMPERATURE: 97.5 F | DIASTOLIC BLOOD PRESSURE: 73 MMHG | HEART RATE: 61 BPM

## 2025-07-21 DIAGNOSIS — E66.811 CLASS 1 OBESITY DUE TO EXCESS CALORIES WITH SERIOUS COMORBIDITY AND BODY MASS INDEX (BMI) OF 31.0 TO 31.9 IN ADULT: ICD-10-CM

## 2025-07-21 DIAGNOSIS — R79.89 ABNORMAL TSH: ICD-10-CM

## 2025-07-21 DIAGNOSIS — R79.89 ELEVATED SERUM CREATININE: ICD-10-CM

## 2025-07-21 DIAGNOSIS — R93.1 AGATSTON CORONARY ARTERY CALCIUM SCORE LESS THAN 100: ICD-10-CM

## 2025-07-21 DIAGNOSIS — R73.03 PRE-DIABETES: ICD-10-CM

## 2025-07-21 DIAGNOSIS — M54.32 SCIATICA OF LEFT SIDE: ICD-10-CM

## 2025-07-21 DIAGNOSIS — E66.09 CLASS 1 OBESITY DUE TO EXCESS CALORIES WITH SERIOUS COMORBIDITY AND BODY MASS INDEX (BMI) OF 31.0 TO 31.9 IN ADULT: ICD-10-CM

## 2025-07-21 DIAGNOSIS — Z71.2 ENCOUNTER TO DISCUSS TEST RESULTS: Primary | ICD-10-CM

## 2025-07-21 DIAGNOSIS — J30.2 SEASONAL ALLERGIES: ICD-10-CM

## 2025-07-21 DIAGNOSIS — E05.90 SUBCLINICAL HYPERTHYROIDISM: ICD-10-CM

## 2025-07-21 LAB
AORTIC VALVE MEAN GRADIENT: 6 MMHG
AORTIC VALVE PEAK VELOCITY: 1.66 M/S
AV PEAK GRADIENT: 11 MMHG
AVA (PEAK VEL): 1.06 CM2
AVA (VTI): 1.07 CM2
EJECTION FRACTION APICAL 4 CHAMBER: 63.8
EJECTION FRACTION: 60 %
LEFT ATRIUM VOLUME AREA LENGTH INDEX BSA: 17.8 ML/M2
LEFT VENTRICLE INTERNAL DIMENSION DIASTOLE: 5.03 CM (ref 3.5–6)
LEFT VENTRICULAR OUTFLOW TRACT DIAMETER: 1.8 CM
LV EJECTION FRACTION BIPLANE: 64 %
MITRAL VALVE E/A RATIO: 0.79
RIGHT VENTRICLE PEAK SYSTOLIC PRESSURE: 29 MMHG
TRICUSPID ANNULAR PLANE SYSTOLIC EXCURSION: 2.3 CM

## 2025-07-21 PROCEDURE — 99215 OFFICE O/P EST HI 40 MIN: CPT | Performed by: INTERNAL MEDICINE

## 2025-07-21 PROCEDURE — 1159F MED LIST DOCD IN RCRD: CPT | Performed by: INTERNAL MEDICINE

## 2025-07-21 PROCEDURE — 1036F TOBACCO NON-USER: CPT | Performed by: INTERNAL MEDICINE

## 2025-07-21 PROCEDURE — 97110 THERAPEUTIC EXERCISES: CPT | Mod: GP

## 2025-07-21 PROCEDURE — 1160F RVW MEDS BY RX/DR IN RCRD: CPT | Performed by: INTERNAL MEDICINE

## 2025-07-21 PROCEDURE — G2211 COMPLEX E/M VISIT ADD ON: HCPCS | Performed by: INTERNAL MEDICINE

## 2025-07-21 RX ORDER — FLUTICASONE PROPIONATE 50 MCG
1 SPRAY, SUSPENSION (ML) NASAL DAILY
Qty: 16 G | Refills: 5 | Status: SHIPPED | OUTPATIENT
Start: 2025-07-21 | End: 2026-01-17

## 2025-07-21 NOTE — PROGRESS NOTES
"Physical Therapy Treatment      Patient Name: Lauren Patel  MRN: [id number]  Today's Date: 7/21/2025  Visit #5/6  Time Calculation  Start Time: 1349  Stop Time: 1429  Time Calculation (min): 40 min    Insurance:  2025 30 COPAY, 0 DED, 4150 OOP MAX, MC 90 DAY 9/25/2025, AUTH 6 VS 6/25-8/23/2025     Assessment:  Continued physical therapy this session in which pt report improvement in symptoms.  Symptoms are remaining improved since last session, pt reporting average 2-3/10 for low back pain.  Reviewed HEP, cueing for proper motion with Paloff Press, in which the pt was able to teach back. Planning on leaving POC open in case symptoms begin to manifest again.  Pt is making progress towards goals and would continue to benefit from skilled PT at this time.     Patient's response to session: No change in pain, Increased ROM/joint mobility, and Increased knowledge and understanding    Plan:  Leaving POC open moving forward:   1) check tolerance to HEP              Continue repeated FLX    Current Problem:   1. Sciatica of left side  Follow Up In Physical Therapy            Subjective     Pt reports, \"I am doing okay pain wise I am a little tired\"     Pain: 2-3/10       Objective       Treatments:  Therapeutic Exercise (77600): 40 minutes 3 Units  Supine Piriformis stretch: (verbally reviewed).   1 x 10 (5s hold).   Supine Figure 4 piriformis stretch: (verbally reviewed)  1 x 10 (5s)  Hip FLX with swiss ball  2 x 10  Supine modified piriformis stretch: (verbally reviewed)  12 reps, 5s hold  Lumbar stretch with swiss ball (repeated)  2 x 10  Nu Step  9 min, Seat 7, resistance: 2-3  Doubles knees to chest with swiss ball: (verbally reviewed).   20 reps.   Supine Marching with TrA activation (verbally Reviewed)  50 reps.   Abdominal press with swiss ball:  2 x 10 (3s hold)  Resisted Rowing, cueing for core stabilization:  20 reps (yellow TB).   Paloff Press T YB:  2 x 10 (reviewed proper posture)  Sit to stand:  10 reps, (pt " reports lower lumbar stress)  HS stretch on step: 2 min x B  Calf stretch on step: 2 min x B.   Straight arm pull down: Y TB.   2 x 10    Manual Therapy (35959):   minutes      Neuromuscular Re-education (24837):   minutes      Education and discussion on HEP and treatment regarding the benefits related to current condition, POC, pathophysiology, and precautions    Access Code: [email or web address]  URL: [email or web address]  Date: 07/21/2025  Prepared by: Barrie Holcomb    Exercises  - Seated Flexion Stretch with Swiss Ball  - 1 x daily - 7 x weekly - 2 sets - 10 reps  - Supine Posterior Pelvic Tilt  - 1 x daily - 7 x weekly - 2 sets - 10 reps  - Seated Posterior Pelvic Tilt  - 1 x daily - 7 x weekly - 2 sets - 10 reps  - Supine Figure 4 Piriformis Stretch  - 1 x daily - 7 x weekly - 1 sets - 12 reps - 5s-10s hold  - Supine Piriformis Stretch with Foot on Ground  - 1 x daily - 7 x weekly - 1 sets - 12 reps - 5s-10s hold  - Supine 90/90 Alternating Toe Touch  - 1 x daily - 7 x weekly - 2 sets - 10 reps  - Standing Anti-Rotation Press with Anchored Resistance  - 1 x daily - 7 x weekly - 2 sets - 10 reps  - Standing Hamstring Stretch with Step  - 1 x daily - 7 x weekly - 1 sets - 10 reps - 5s-10s hold

## 2025-07-21 NOTE — PROGRESS NOTES
Subjective   Patient ID: Lauren Patel is a 74 y.o. female who presents for Follow-up (Results on blood work, CT cardiac score and Echo).    HPI Pt is a pleasant 75 yo CF who was seen and evaluated during the FU OV. PT recently had the detailed cardiac evaluation and lab tests. Pt would like to discuss the results In details. During the clinical encounter pt states that she feels fine and her main concern is seasonal allergy which is getting worse with the warm humid weather. During the clinical encounter pt denies fever, chills, no SOB, no chest pain/tightness, no abdominal pain, no N/V/D reported, no change of urination reported. Pt denies any other health concerns during this visit.  Sohx: reviewed  List of the medications and allergies: reviewed  Fhx  and PMHx: reviewed      Review of Systems  The systems have been reviewed as follows:   Constitutional: no fever, no chills  Eyes: no eyesight problems, no eye redness, no eye pain, no blurred vision  ENT: no ear pain or sore throat, no hearing loss, but reports sinus congestion after being out side  Cardiovascular: no chest pain or tightness, no SOB, the heart rate was not fast or slow  Respiratory: no cough, no dyspnea with exertion or with rest, no wheezing  Gastrointestinal: no abdominal pain, no constipation or diarrhea, no heartburn, no nausea or vomiting  Genitourinary: no urine frequency, no dysuria, no urinary urgency, no urinary incontinence or retention  Musculoskeletal: no back pain, no arthralgia, no joint swelling or stiffness, no muscle weakness  Integumentary: no skin lesions or rash  Neurological: no headaches, no dizziness or fainting, no limb weakness  Psychiatric: no mood changes, no anxiety or depression, no SI/HI  Endocrine: no weight change, no temperature intolerance, no change of appetite  Hematologic/Lymphatic: no easy bruising or bleeding    Objective   /73 (BP Location: Left arm, Patient Position: Sitting)   Pulse 61   Temp  "36.4 °C (97.5 °F)   Ht 1.575 m (5' 2\")   Wt 77.9 kg (171 lb 12.8 oz)   BMI 31.42 kg/m²     Physical Exam  Constitutional: well hydrated, well nourished, no acute distress. Vital signs reviewed  Head and face: normocephalic, atraumatic  Eyes: no erythema, edema or visible abnormalities of conjunctiva or lids. Pupils are equal, round and reactive to light. Extraocular movement normal  ENT: external ears without deformities  Neck: full ROM, no adenopathy, neck was supple, thyroid gland not enlarged, no palpable nodules  Pulmonary: normal respiratory rate and effort. Lungs are clear to auscultation, no rales,no rhonchi or wheezing  Cardiovascular: RRR, normal S1 and S2, no murmur, no gallop, posterior tib. pulse normal 2+ bilaterally, no lower extremity edema  Abdomen: soft, non tender on palpation, not distended, normal BS in all 4 quadrants  Musculoskeletal: no joint swelling, normal movements of all 4 extremities. Gait and station: normal, Digits and nails: normal without clubbing  Skin: normal color, no rash  Neurologic: Cranial nerves: CN II: visual acuity intact. Source: acuity reported by patient. Pupils reactive to light with normal accommodation. Right and left pupil normal CN III, IV and VI: the EO movements were intact. Deep tendon reflexes: were 2+ and symmetric: R and L patella.  Sensory exam: normal to light touch  Psychiatric: Mood and affect: normal, Alert and Oriented x 3  Lymphatic: no cervical lymphadenopathy  Assessment/Plan   Encounter to discuss the recent test results:  I shared, reviewed and discuss ed the recent test results with the pt in details.  Abnormal TSH/subclinical hypothyroidism: Will check TSH/T4 level in 3 months as per the pts request. Pt declined any change of the current dose of levothyroxin  Pre diabetes: stable, another HGba1C level in 3 months  Elevated Cr level, decreased EGFR: pt declined evaluation with the nephrology team. Will repeat CMP in 3 months + U " alb/cr/protein    Coronary artery score is less than 100, lipid panel WNL  Cardiac stress test:  1. No clinical evidence for ischemia at submaximal workload.   2. No ischemia by ECG at submax workload.   3. Submaximal level of stress achieved.  ECHO: pending results  Pt declined any additional  evaluation with the cardiology team for now.    Seasonal allergy: will start on Flonase nasal spray    A comprehensive discussion regarding all presenting signs and symptoms was conducted with the patient. The patient demonstrated understanding of the diagnosed health condition and expressed agreement with the proposed clinical management plan as outlined above. The patient is advised to follow up with their primary care provider (PCP) as scheduled, or earlier if clinically indicated.

## 2025-08-13 DIAGNOSIS — J30.2 SEASONAL ALLERGIES: ICD-10-CM

## 2025-08-13 RX ORDER — FLUTICASONE PROPIONATE 50 MCG
1 SPRAY, SUSPENSION (ML) NASAL DAILY
Qty: 48 ML | Refills: 2 | Status: SHIPPED | OUTPATIENT
Start: 2025-08-13 | End: 2026-02-09

## 2025-09-02 ENCOUNTER — TELEPHONE (OUTPATIENT)
Dept: PRIMARY CARE | Facility: CLINIC | Age: 75
End: 2025-09-02
Payer: MEDICARE

## 2025-09-04 ENCOUNTER — OFFICE VISIT (OUTPATIENT)
Dept: PRIMARY CARE | Facility: CLINIC | Age: 75
End: 2025-09-04
Payer: MEDICARE

## 2025-09-04 ENCOUNTER — HOSPITAL ENCOUNTER (OUTPATIENT)
Dept: RADIOLOGY | Facility: HOSPITAL | Age: 75
Discharge: HOME | End: 2025-09-04
Payer: MEDICARE

## 2025-09-04 VITALS
HEART RATE: 71 BPM | DIASTOLIC BLOOD PRESSURE: 82 MMHG | BODY MASS INDEX: 31.65 KG/M2 | WEIGHT: 172 LBS | HEIGHT: 62 IN | SYSTOLIC BLOOD PRESSURE: 118 MMHG | TEMPERATURE: 96.6 F

## 2025-09-04 DIAGNOSIS — M25.539 PAIN IN WRIST, UNSPECIFIED LATERALITY: ICD-10-CM

## 2025-09-04 DIAGNOSIS — M79.672 PAIN IN BOTH FEET: ICD-10-CM

## 2025-09-04 DIAGNOSIS — M25.50 MULTIPLE JOINT PAIN: Primary | ICD-10-CM

## 2025-09-04 DIAGNOSIS — N64.4 PAIN OF RIGHT BREAST: ICD-10-CM

## 2025-09-04 DIAGNOSIS — M79.671 PAIN IN BOTH FEET: ICD-10-CM

## 2025-09-04 DIAGNOSIS — Z87.39 HX OF LOW BACK PAIN: ICD-10-CM

## 2025-09-04 PROCEDURE — 1036F TOBACCO NON-USER: CPT | Performed by: INTERNAL MEDICINE

## 2025-09-04 PROCEDURE — 73620 X-RAY EXAM OF FOOT: CPT | Mod: 50

## 2025-09-04 PROCEDURE — 73620 X-RAY EXAM OF FOOT: CPT | Mod: BILATERAL PROCEDURE | Performed by: RADIOLOGY

## 2025-09-04 PROCEDURE — G2211 COMPLEX E/M VISIT ADD ON: HCPCS | Performed by: INTERNAL MEDICINE

## 2025-09-04 PROCEDURE — 99215 OFFICE O/P EST HI 40 MIN: CPT | Performed by: INTERNAL MEDICINE

## 2025-09-04 PROCEDURE — 73110 X-RAY EXAM OF WRIST: CPT | Mod: BILATERAL PROCEDURE | Performed by: RADIOLOGY

## 2025-09-04 PROCEDURE — 1159F MED LIST DOCD IN RCRD: CPT | Performed by: INTERNAL MEDICINE

## 2025-09-04 PROCEDURE — 1160F RVW MEDS BY RX/DR IN RCRD: CPT | Performed by: INTERNAL MEDICINE

## 2025-09-04 PROCEDURE — 73110 X-RAY EXAM OF WRIST: CPT | Mod: 50

## 2025-09-04 RX ORDER — DEXTROMETHORPHAN HYDROBROMIDE, GUAIFENESIN 5; 100 MG/5ML; MG/5ML
650 LIQUID ORAL EVERY 8 HOURS PRN
Qty: 30 TABLET | Refills: 0 | Status: SHIPPED | OUTPATIENT
Start: 2025-09-04 | End: 2025-09-14

## 2025-09-05 LAB
ALBUMIN SERPL-MCNC: 4.3 G/DL (ref 3.6–5.1)
ALP SERPL-CCNC: 58 U/L (ref 37–153)
ALT SERPL-CCNC: 15 U/L (ref 6–29)
ANA PAT SER IF-IMP: ABNORMAL
ANA PAT SER IF-IMP: ABNORMAL
ANA SER QL IF: POSITIVE
ANA TITR SER IF: ABNORMAL TITER
ANA TITR SER IF: ABNORMAL TITER
ANION GAP SERPL CALCULATED.4IONS-SCNC: 7 MMOL/L (CALC) (ref 7–17)
AST SERPL-CCNC: 19 U/L (ref 10–35)
BASOPHILS # BLD AUTO: 48 CELLS/UL (ref 0–200)
BASOPHILS NFR BLD AUTO: 1.2 %
BILIRUB SERPL-MCNC: 0.9 MG/DL (ref 0.2–1.2)
BUN SERPL-MCNC: 15 MG/DL (ref 7–25)
CALCIUM SERPL-MCNC: 9.3 MG/DL (ref 8.6–10.4)
CHLORIDE SERPL-SCNC: 107 MMOL/L (ref 98–110)
CO2 SERPL-SCNC: 28 MMOL/L (ref 20–32)
CREAT SERPL-MCNC: 1.07 MG/DL (ref 0.6–1)
CRP SERPL-MCNC: <3 MG/L
EGFRCR SERPLBLD CKD-EPI 2021: 53 ML/MIN/1.73M2
EOSINOPHIL # BLD AUTO: 328 CELLS/UL (ref 15–500)
EOSINOPHIL NFR BLD AUTO: 8.2 %
ERYTHROCYTE [DISTWIDTH] IN BLOOD BY AUTOMATED COUNT: 13.2 % (ref 11–15)
ERYTHROCYTE [SEDIMENTATION RATE] IN BLOOD BY WESTERGREN METHOD: 6 MM/H
GLUCOSE SERPL-MCNC: 90 MG/DL (ref 65–139)
HCT VFR BLD AUTO: 45.3 % (ref 35–45)
HGB BLD-MCNC: 14.5 G/DL (ref 11.7–15.5)
LYMPHOCYTES # BLD AUTO: 1088 CELLS/UL (ref 850–3900)
LYMPHOCYTES NFR BLD AUTO: 27.2 %
MCH RBC QN AUTO: 30.9 PG (ref 27–33)
MCHC RBC AUTO-ENTMCNC: 32 G/DL (ref 32–36)
MCV RBC AUTO: 96.4 FL (ref 80–100)
MONOCYTES # BLD AUTO: 388 CELLS/UL (ref 200–950)
MONOCYTES NFR BLD AUTO: 9.7 %
NEUTROPHILS # BLD AUTO: 2148 CELLS/UL (ref 1500–7800)
NEUTROPHILS NFR BLD AUTO: 53.7 %
PLATELET # BLD AUTO: 233 THOUSAND/UL (ref 140–400)
PMV BLD REES-ECKER: 10.1 FL (ref 7.5–12.5)
POTASSIUM SERPL-SCNC: 5 MMOL/L (ref 3.5–5.3)
PROT SERPL-MCNC: 6.8 G/DL (ref 6.1–8.1)
RBC # BLD AUTO: 4.7 MILLION/UL (ref 3.8–5.1)
RHEUMATOID FACT SERPL-ACNC: <10 IU/ML
SODIUM SERPL-SCNC: 142 MMOL/L (ref 135–146)
WBC # BLD AUTO: 4 THOUSAND/UL (ref 3.8–10.8)

## 2025-10-27 ENCOUNTER — APPOINTMENT (OUTPATIENT)
Dept: PRIMARY CARE | Facility: CLINIC | Age: 75
End: 2025-10-27
Payer: MEDICARE